# Patient Record
Sex: FEMALE | Race: WHITE | NOT HISPANIC OR LATINO | Employment: OTHER | URBAN - METROPOLITAN AREA
[De-identification: names, ages, dates, MRNs, and addresses within clinical notes are randomized per-mention and may not be internally consistent; named-entity substitution may affect disease eponyms.]

---

## 2018-06-16 ENCOUNTER — OFFICE VISIT (OUTPATIENT)
Dept: URGENT CARE | Facility: CLINIC | Age: 63
End: 2018-06-16
Payer: COMMERCIAL

## 2018-06-16 VITALS
HEIGHT: 60 IN | TEMPERATURE: 98 F | RESPIRATION RATE: 16 BRPM | BODY MASS INDEX: 39.79 KG/M2 | WEIGHT: 202.69 LBS | HEART RATE: 68 BPM | DIASTOLIC BLOOD PRESSURE: 72 MMHG | OXYGEN SATURATION: 97 % | SYSTOLIC BLOOD PRESSURE: 120 MMHG

## 2018-06-16 DIAGNOSIS — J02.9 PHARYNGITIS, UNSPECIFIED ETIOLOGY: Primary | ICD-10-CM

## 2018-06-16 DIAGNOSIS — J30.9 ALLERGIC RHINITIS, UNSPECIFIED SEASONALITY, UNSPECIFIED TRIGGER: ICD-10-CM

## 2018-06-16 LAB
CTP QC/QA: YES
S PYO RRNA THROAT QL PROBE: NEGATIVE

## 2018-06-16 PROCEDURE — 99999 PR PBB SHADOW E&M-NEW PATIENT-LVL IV: CPT | Mod: PBBFAC,,, | Performed by: FAMILY MEDICINE

## 2018-06-16 PROCEDURE — 87880 STREP A ASSAY W/OPTIC: CPT | Mod: QW,S$GLB,, | Performed by: FAMILY MEDICINE

## 2018-06-16 PROCEDURE — 87081 CULTURE SCREEN ONLY: CPT

## 2018-06-16 PROCEDURE — 99203 OFFICE O/P NEW LOW 30 MIN: CPT | Mod: 25,S$GLB,, | Performed by: FAMILY MEDICINE

## 2018-06-16 PROCEDURE — 3008F BODY MASS INDEX DOCD: CPT | Mod: CPTII,S$GLB,, | Performed by: FAMILY MEDICINE

## 2018-06-16 PROCEDURE — 96372 THER/PROPH/DIAG INJ SC/IM: CPT | Mod: S$GLB,,, | Performed by: FAMILY MEDICINE

## 2018-06-16 RX ORDER — METOPROLOL SUCCINATE 25 MG/1
TABLET, EXTENDED RELEASE ORAL
COMMUNITY
Start: 2018-05-14

## 2018-06-16 RX ORDER — TRIAMCINOLONE ACETONIDE 40 MG/ML
40 INJECTION, SUSPENSION INTRA-ARTICULAR; INTRAMUSCULAR
Status: COMPLETED | OUTPATIENT
Start: 2018-06-16 | End: 2018-06-16

## 2018-06-16 RX ORDER — METHYLPREDNISOLONE 4 MG/1
TABLET ORAL
Qty: 1 PACKAGE | Refills: 0 | Status: SHIPPED | OUTPATIENT
Start: 2018-06-17 | End: 2018-11-17

## 2018-06-16 RX ADMIN — TRIAMCINOLONE ACETONIDE 40 MG: 40 INJECTION, SUSPENSION INTRA-ARTICULAR; INTRAMUSCULAR at 08:06

## 2018-06-16 NOTE — PROGRESS NOTES
Subjective:       Patient ID: Jeannette Davis is a 62 y.o. female.    Chief Complaint: Sore Throat    Sore Throat    This is a new problem. The current episode started in the past 7 days. The problem has been gradually improving. There has been no fever. The pain is at a severity of 6/10. The pain is moderate. Associated symptoms include ear pain, a hoarse voice and swollen glands. Pertinent negatives include no abdominal pain, coughing, diarrhea, drooling, headaches, shortness of breath, trouble swallowing or vomiting. She has had no exposure to strep. Treatments tried: Loratadine, Flonase, throat lozenges. The treatment provided no relief.   Patient reports allergies at baseline. No recent travel. No recent antibiotic use. Denies any reflux.    Past Medical History:   Diagnosis Date    Hypertension      Past Surgical History:   Procedure Laterality Date    HYSTERECTOMY      LEG SURGERY      TONSILLECTOMY       Family History   Problem Relation Age of Onset    Glaucoma Mother     Cataracts Mother     Cataracts Sister      Review of Systems   Constitutional: Negative for chills, fatigue and fever.   HENT: Positive for ear pain, hoarse voice, postnasal drip and sore throat. Negative for drooling and trouble swallowing.    Eyes: Negative for visual disturbance.   Respiratory: Negative for cough and shortness of breath.    Cardiovascular: Negative for chest pain and palpitations.   Gastrointestinal: Negative for abdominal pain, diarrhea, nausea and vomiting.   Genitourinary: Negative for decreased urine volume and difficulty urinating.   Musculoskeletal: Negative for arthralgias and myalgias.   Skin: Negative for rash.   Allergic/Immunologic: Positive for environmental allergies.   Neurological: Negative for dizziness, weakness and headaches.   Hematological: Positive for adenopathy.   Psychiatric/Behavioral: Positive for sleep disturbance.       Objective:   /72 (BP Location: Right arm, Patient  Position: Sitting, BP Method: Large (Manual))   Pulse 68   Temp 97.6 °F (36.4 °C) (Tympanic)   Resp 16   Ht 5' (1.524 m)   Wt 92 kg (202 lb 11.4 oz)   SpO2 97%   BMI 39.59 kg/m²   Physical Exam   Constitutional: She appears well-developed and well-nourished. No distress.   Obese, mildly ill appearing, non-toxic   HENT:   Head: Normocephalic and atraumatic.   Right Ear: Tympanic membrane, external ear and ear canal normal.   Left Ear: Tympanic membrane, external ear and ear canal normal.   Nose: Mucosal edema present. No rhinorrhea.   Mouth/Throat: Uvula is midline. Posterior oropharyngeal erythema present. No oropharyngeal exudate.   Eyes: Conjunctivae and EOM are normal. Pupils are equal, round, and reactive to light.   Neck: Normal range of motion. Neck supple.   Cardiovascular: Normal rate, regular rhythm and normal heart sounds.    Pulmonary/Chest: Effort normal and breath sounds normal.   Abdominal: Soft. Bowel sounds are normal.   Lymphadenopathy:     She has no cervical adenopathy.   Skin: Skin is warm and dry. She is not diaphoretic.   Psychiatric: She has a normal mood and affect.       Assessment:       1. Pharyngitis, unspecified etiology    2. Allergic rhinitis, unspecified seasonality, unspecified trigger        Plan:      Pharyngitis, unspecified etiology  Negative RST. Suspect viral etiology and underlying AR as contributory. Throat culture pending. Kenalog IM provided in office. She will start Medrol ana tomorrow. Vocal rest. Magic mouthwash prn. See PCP if symptoms worsen or fail to improve over the weekend. Discussed self care for sore throat.  -     triamcinolone acetonide injection 40 mg; Inject 1 mL (40 mg total) into the muscle one time.  -     POCT Rapid Strep A  -     Strep A culture, throat  -     methylPREDNISolone (MEDROL DOSEPACK) 4 mg tablet; use as directed  Dispense: 1 Package; Refill: 0  -     Discontinue: (Magic mouthwash) 1:1:1 Benadryl 12.5mg/5ml liq, aluminum & magnesium  hydroxide-simehticone (Maalox), lidocaine viscous 2%; Swish and spit 5 mLs every 4 (four) hours as needed. For sore throat  Dispense: 360 mL; Refill: 0  -     (Magic mouthwash) 1:1:1 Benadryl 12.5mg/5ml liq, aluminum & magnesium hydroxide-simehticone (Maalox), lidocaine viscous 2%; Swish and spit 5 mLs every 4 (four) hours as needed. For sore throat  Dispense: 360 mL; Refill: 0    Allergic rhinitis, unspecified seasonality, unspecified trigger  Continue with Loratadine and Flonase use.

## 2018-06-18 LAB — BACTERIA THROAT CULT: NORMAL

## 2018-11-17 ENCOUNTER — OFFICE VISIT (OUTPATIENT)
Dept: INTERNAL MEDICINE | Facility: CLINIC | Age: 63
End: 2018-11-17
Payer: COMMERCIAL

## 2018-11-17 VITALS
TEMPERATURE: 97 F | BODY MASS INDEX: 39.05 KG/M2 | OXYGEN SATURATION: 97 % | WEIGHT: 198.88 LBS | SYSTOLIC BLOOD PRESSURE: 138 MMHG | RESPIRATION RATE: 17 BRPM | HEART RATE: 59 BPM | HEIGHT: 60 IN | DIASTOLIC BLOOD PRESSURE: 78 MMHG

## 2018-11-17 DIAGNOSIS — J06.9 UPPER RESPIRATORY TRACT INFECTION, UNSPECIFIED TYPE: Primary | ICD-10-CM

## 2018-11-17 DIAGNOSIS — R73.03 PREDIABETES: ICD-10-CM

## 2018-11-17 DIAGNOSIS — J02.9 SORE THROAT: ICD-10-CM

## 2018-11-17 DIAGNOSIS — Z20.818 STREPTOCOCCUS EXPOSURE: ICD-10-CM

## 2018-11-17 DIAGNOSIS — I10 ESSENTIAL HYPERTENSION: ICD-10-CM

## 2018-11-17 LAB
CTP QC/QA: YES
S PYO RRNA THROAT QL PROBE: NEGATIVE

## 2018-11-17 PROCEDURE — 96372 THER/PROPH/DIAG INJ SC/IM: CPT | Mod: S$GLB,,, | Performed by: FAMILY MEDICINE

## 2018-11-17 PROCEDURE — 99999 PR PBB SHADOW E&M-EST. PATIENT-LVL III: CPT | Mod: PBBFAC,,, | Performed by: FAMILY MEDICINE

## 2018-11-17 PROCEDURE — 3008F BODY MASS INDEX DOCD: CPT | Mod: CPTII,S$GLB,, | Performed by: FAMILY MEDICINE

## 2018-11-17 PROCEDURE — 87880 STREP A ASSAY W/OPTIC: CPT | Mod: QW,S$GLB,, | Performed by: FAMILY MEDICINE

## 2018-11-17 PROCEDURE — 99213 OFFICE O/P EST LOW 20 MIN: CPT | Mod: 25,S$GLB,, | Performed by: FAMILY MEDICINE

## 2018-11-17 RX ORDER — BETAMETHASONE SODIUM PHOSPHATE AND BETAMETHASONE ACETATE 3; 3 MG/ML; MG/ML
6 INJECTION, SUSPENSION INTRA-ARTICULAR; INTRALESIONAL; INTRAMUSCULAR; SOFT TISSUE
Status: COMPLETED | OUTPATIENT
Start: 2018-11-17 | End: 2018-11-17

## 2018-11-17 RX ADMIN — BETAMETHASONE SODIUM PHOSPHATE AND BETAMETHASONE ACETATE 6 MG: 3; 3 INJECTION, SUSPENSION INTRA-ARTICULAR; INTRALESIONAL; INTRAMUSCULAR; SOFT TISSUE at 09:11

## 2018-11-17 NOTE — PROGRESS NOTES
Subjective:       Patient ID: Jeannette Davis is a 63 y.o. female.    Chief Complaint: Otalgia and Sore Throat      HPI Comments:       Current Outpatient Medications:     (Magic mouthwash) 1:1:1 Benadryl 12.5mg/5ml liq, aluminum & magnesium hydroxide-simehticone (Maalox), lidocaine viscous 2%, Swish and spit 5 mLs every 4 (four) hours as needed. For sore throat, Disp: 360 mL, Rfl: 0    atorvastatin (LIPITOR) 20 MG tablet, Take 20 mg by mouth once daily., Disp: , Rfl:     estradiol (ESTRACE) 0.5 MG tablet, Take 0.5 mg by mouth once daily., Disp: , Rfl:     hydrochlorothiazide (HYDRODIURIL) 25 MG tablet, Take 25 mg by mouth once daily., Disp: , Rfl:     metoprolol succinate (TOPROL-XL) 25 MG 24 hr tablet, TAKE 1 TABLET BY MOUTH DAILY, Disp: , Rfl:     olmesartan (BENICAR) 20 MG tablet, Take 10 mg by mouth as needed. , Disp: , Rfl:   No current facility-administered medications for this visit.       3 day history of ST, PND sneezing and coughing. Clear mucus. R ear pain. No fever, chills, GI sx. Recent strep exp. Non-smoker. Takes claritin daily. No other cold meds. Nothing makes better or worse.    PMH: HTN, Prediabetes (pt unaware)      Review of Systems   Constitutional: Negative for activity change, appetite change, chills and fever.   HENT: Positive for congestion, postnasal drip, rhinorrhea, sinus pressure, sneezing and sore throat.    Respiratory: Positive for cough. Negative for shortness of breath and wheezing.    Cardiovascular: Negative for chest pain.   Gastrointestinal: Negative for abdominal pain, diarrhea and nausea.   Genitourinary: Negative for difficulty urinating.   Musculoskeletal: Negative for arthralgias and myalgias.   Neurological: Negative for dizziness and headaches.       Objective:      Vitals:    11/17/18 0846   BP: 138/78   Pulse: (!) 59   Resp: 17   Temp: 97.4 °F (36.3 °C)   SpO2: 97%   Weight: 90.2 kg (198 lb 13.7 oz)   Height: 5' (1.524 m)     Physical Exam   Constitutional:  She is oriented to person, place, and time. She appears well-developed and well-nourished.  Non-toxic appearance. She appears ill. No distress.   Audibly congested in upper airway   HENT:   Head: Normocephalic.   Right Ear: Tympanic membrane, external ear and ear canal normal.   Left Ear: Tympanic membrane, external ear and ear canal normal.   Nose: Mucosal edema and rhinorrhea present.   Mouth/Throat: Mucous membranes are normal. Posterior oropharyngeal edema present. No oropharyngeal exudate or posterior oropharyngeal erythema.   Neck: Neck supple. No thyromegaly present.   Cardiovascular: Normal rate, regular rhythm and normal heart sounds.   No murmur heard.  Pulmonary/Chest: Effort normal and breath sounds normal. She has no wheezes. She has no rales.   Abdominal: Soft. She exhibits no distension.   Musculoskeletal: She exhibits no edema.   Lymphadenopathy:     She has no cervical adenopathy.   Neurological: She is alert and oriented to person, place, and time.   Skin: Skin is warm and dry. She is not diaphoretic.   Psychiatric: She has a normal mood and affect. Her behavior is normal. Judgment and thought content normal.   Nursing note and vitals reviewed.      Assessment:       1. Upper respiratory tract infection, unspecified type    2. Streptococcus exposure    3. Sore throat    4. Essential hypertension    5. Prediabetes        Plan:   Upper respiratory tract infection, unspecified type  Comments:  celestone IM, mucinex DM  Orders:  -     betamethasone acetate-betamethasone sodium phosphate injection 6 mg    Streptococcus exposure  Comments:  rapid strep neg  Orders:  -     POCT Rapid Strep A    Sore throat  Comments:  tylenol, warm salt water gargles.  Orders:  -     POCT Rapid Strep A    Essential hypertension  Comments:  controlled    Prediabetes  Comments:  will f/u with PCP

## 2019-01-19 ENCOUNTER — OFFICE VISIT (OUTPATIENT)
Dept: URGENT CARE | Facility: CLINIC | Age: 64
End: 2019-01-19
Payer: COMMERCIAL

## 2019-01-19 ENCOUNTER — HOSPITAL ENCOUNTER (OUTPATIENT)
Dept: RADIOLOGY | Facility: HOSPITAL | Age: 64
Discharge: HOME | End: 2019-01-19
Attending: PHYSICIAN ASSISTANT
Payer: COMMERCIAL

## 2019-01-19 VITALS
DIASTOLIC BLOOD PRESSURE: 84 MMHG | HEART RATE: 72 BPM | TEMPERATURE: 98 F | SYSTOLIC BLOOD PRESSURE: 146 MMHG | OXYGEN SATURATION: 97 % | WEIGHT: 202.19 LBS | BODY MASS INDEX: 39.69 KG/M2 | HEIGHT: 60 IN
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
## 2019-01-19 DIAGNOSIS — M54.9 BACK PAIN, UNSPECIFIED BACK LOCATION, UNSPECIFIED BACK PAIN LATERALITY, UNSPECIFIED CHRONICITY: Primary | ICD-10-CM

## 2019-01-19 DIAGNOSIS — M54.50 LOW BACK PAIN, NON-SPECIFIC: ICD-10-CM

## 2019-01-19 PROCEDURE — 99999 PR PBB SHADOW E&M-EST. PATIENT-LVL IV: ICD-10-PCS | Mod: PBBFAC,,, | Performed by: PHYSICIAN ASSISTANT

## 2019-01-19 PROCEDURE — 3077F PR MOST RECENT SYSTOLIC BLOOD PRESSURE >= 140 MM HG: ICD-10-PCS | Mod: CPTII,S$GLB,, | Performed by: PHYSICIAN ASSISTANT

## 2019-01-19 PROCEDURE — 3008F PR BODY MASS INDEX (BMI) DOCUMENTED: ICD-10-PCS | Mod: CPTII,S$GLB,, | Performed by: PHYSICIAN ASSISTANT

## 2019-01-19 PROCEDURE — 3077F SYST BP >= 140 MM HG: CPT | Mod: CPTII,S$GLB,, | Performed by: PHYSICIAN ASSISTANT

## 2019-01-19 PROCEDURE — 99214 OFFICE O/P EST MOD 30 MIN: CPT | Mod: S$GLB,,, | Performed by: PHYSICIAN ASSISTANT

## 2019-01-19 PROCEDURE — 3079F PR MOST RECENT DIASTOLIC BLOOD PRESSURE 80-89 MM HG: ICD-10-PCS | Mod: CPTII,S$GLB,, | Performed by: PHYSICIAN ASSISTANT

## 2019-01-19 PROCEDURE — 72110 X-RAY EXAM L-2 SPINE 4/>VWS: CPT | Mod: TC

## 2019-01-19 PROCEDURE — 3008F BODY MASS INDEX DOCD: CPT | Mod: CPTII,S$GLB,, | Performed by: PHYSICIAN ASSISTANT

## 2019-01-19 PROCEDURE — 99214 PR OFFICE/OUTPT VISIT, EST, LEVL IV, 30-39 MIN: ICD-10-PCS | Mod: S$GLB,,, | Performed by: PHYSICIAN ASSISTANT

## 2019-01-19 PROCEDURE — 99999 PR PBB SHADOW E&M-EST. PATIENT-LVL IV: CPT | Mod: PBBFAC,,, | Performed by: PHYSICIAN ASSISTANT

## 2019-01-19 PROCEDURE — 72110 XR LUMBAR SPINE COMPLETE 5 VIEW: ICD-10-PCS | Mod: 26,,, | Performed by: RADIOLOGY

## 2019-01-19 PROCEDURE — 3079F DIAST BP 80-89 MM HG: CPT | Mod: CPTII,S$GLB,, | Performed by: PHYSICIAN ASSISTANT

## 2019-01-19 PROCEDURE — 72110 X-RAY EXAM L-2 SPINE 4/>VWS: CPT | Mod: 26,,, | Performed by: RADIOLOGY

## 2019-01-19 RX ORDER — TIZANIDINE 2 MG/1
4 TABLET ORAL EVERY 8 HOURS PRN
Qty: 30 TABLET | Refills: 0 | Status: SHIPPED | OUTPATIENT
Start: 2019-01-19 | End: 2019-01-29

## 2019-01-19 RX ORDER — METHYLPREDNISOLONE 4 MG/1
TABLET ORAL
Qty: 1 PACKAGE | Refills: 0 | Status: SHIPPED | OUTPATIENT
Start: 2019-01-19 | End: 2020-02-03 | Stop reason: ALTCHOICE

## 2019-01-19 NOTE — PROGRESS NOTES
Subjective:       Patient ID: Jeannette Davis is a 63 y.o. female.    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 The current episode started more than 1 month ago (for about 3-4 months). The problem occurs constantly. The problem has been gradually worsening since onset. The pain is present in the gluteal (lower spine). The pain does not radiate (is starting to feel a little higher in the back; occasional pain down the legs). The pain is moderate. The pain is the same all the time. The symptoms are aggravated by bending. Associated symptoms include leg pain (occasional). Pertinent negatives include no abdominal pain, bladder incontinence, bowel incontinence, chest pain, dysuria, fever, headaches, numbness, perianal numbness or weakness. Treatments tried: heating pad, biofreeze. The treatment provided no relief.     Review of Systems   Constitutional: Negative for chills, fatigue and fever.   HENT: Negative for congestion, ear discharge, ear pain, postnasal drip, rhinorrhea, sinus pressure, sneezing and sore throat.    Eyes: Negative for pain and discharge.   Respiratory: Negative for cough, shortness of breath and wheezing.    Cardiovascular: Negative for chest pain and leg swelling.   Gastrointestinal: Negative for abdominal pain, bowel incontinence, nausea and vomiting.   Genitourinary: Negative for bladder incontinence and dysuria.   Musculoskeletal: Positive for back pain. Negative for myalgias.   Skin: Negative for rash.   Neurological: Negative for weakness, numbness and headaches.       Objective:      BP (!) 146/84 (BP Location: Left arm, Patient Position: Sitting)   Pulse 72   Temp 97.8 °F (36.6 °C) (Oral)   Ht 5' (1.524 m)   Wt 91.7 kg (202 lb 2.6 oz)   SpO2 97%   BMI 39.48 kg/m²   Physical Exam   Constitutional: She is oriented to person, place, and time. She appears well-developed  and well-nourished. No distress.   HENT:   Head: Normocephalic.   Right Ear: External ear normal.   Left Ear: External ear normal.   Nose: Nose normal.   Eyes: Conjunctivae and EOM are normal. Right eye exhibits no discharge. Left eye exhibits no discharge.   Neck: Normal range of motion. Neck supple.   Musculoskeletal:        Lumbar back: She exhibits tenderness and spasm. She exhibits normal range of motion, no bony tenderness and no swelling.   Negative SLR and crossed SLR. No rash.  L1-S1 5/5 strength bilaterally  Sensation intact to light touch bilaterally     Neurological: She is alert and oriented to person, place, and time. She has normal reflexes. She displays normal reflexes. Coordination normal.   Skin: Skin is warm and dry. No rash noted. She is not diaphoretic. No erythema.   Vitals reviewed.      Assessment:       1. Back pain, unspecified back location, unspecified back pain laterality, unspecified chronicity    2. Low back pain, non-specific    3. Fall, initial encounter        Plan:       Back pain, unspecified back location, unspecified back pain laterality, unspecified chronicity  -     Cancel: POCT urine dipstick without microscope  -     methylPREDNISolone (MEDROL DOSEPACK) 4 mg tablet; use as directed  Dispense: 1 Package; Refill: 0    Low back pain, non-specific  -     X-Ray Lumbar Spine Complete 5 View; Future; Expected date: 01/19/2019    Fall, initial encounter  -     X-Ray Lumbar Spine Complete 5 View; Future; Expected date: 01/19/2019    Other orders  -     tiZANidine (ZANAFLEX) 2 MG tablet; Take 2 tablets (4 mg total) by mouth every 8 (eight) hours as needed (muscle pain).  Dispense: 30 tablet; Refill: 0    Negative SLR although following exam patient reports the radiating back pain returned. XR due to symptoms >3 months and hx of fall onto her buttocks about 1 month ago. Continue heat, add zanaflex. She states she has GI issues with NSAID's so will proceed with medrol pack for  inflammation relief. Follow up with PCP if not improving.      Heather Trant PA-C [facility]

## 2019-01-19 NOTE — PATIENT INSTRUCTIONS
General Neck and Back Pain    Both neck and back pain are usually caused by injury to the muscles or ligaments of the spine. Sometimes the disks that separate each bone of the spine may cause pain by pressing on a nearby nerve. Back and neck pain may appear after a sudden twisting or bending force (such as in a car accident), or sometimes after a simple awkward movement. In either case, muscle spasm is often present and adds to the pain.  Acute neck and back pain usually gets better in 1 to 2 weeks. Pain related to disk disease, arthritis in the spinal joints or spinal stenosis (narrowing of the spinal canal) can become chronic and last for months or years.  Back and neck pain are common problems. Most people feel better in 1 or 2 weeks, and most of the rest in 1 to 2 months. Most people can remain active.  People experience and describe pain differently.  · Pain can be sharp, stabbing, shooting, aching, cramping, or burning  · Movement, standing, bending, lifting, sitting, or walking may worsen the pain  · Pain can be localized to one spot or area, or it can be more generalized  · Pain can spread or radiate upwards, downwards, to the front, or go down your arms  · Muscle spasm may occur.  Most of the time mechanical problems with the muscles or spine cause the pain. it is usually caused by an injury, whether known or not, to the muscles or ligaments. While illnesses can cause back pain, it is usually not caused by a serious illness. Pain is usually related to physical activity, whether sports, exercise, work, or normal activity. Sometimes it can occur without an identifiable cause. This can happen simply by stretching or moving wrong, without noting pain at the time. Other causes include:  · Overexertion, lifting, pushing, pulling incorrectly or too aggressively.  · Sudden twisting, bending or stretching from an accident (car or fall), or accidental movement.  · Poor posture  · Poor conditioning, lack of regular  exercise  · Spinal disc disease or arthritis  · Stress  · Pregnancy, or illness like appendicitis, bladder or kidney infection, pelvic infections   Home care  · For neck pain: Use a comfortable pillow that supports the head and keeps the spine in a neutral position. The position of the head should not be tilted forward or backward.  · When in bed, try to find a position of comfort. A firm mattress is best. Try lying flat on your back with pillows under your knees. You can also try lying on your side with your knees bent up towards your chest and a pillow between your knees.  · At first, do not try to stretch out the sore spots. If there is a strain, it is not like the good soreness you get after exercising without an injury. In this case, stretching may make it worse.  · Avoid prolonged sitting, long car rides or travel. This puts more stress on the lower back than standing or walking.  · During the first 24 to 72 hours after an injury, apply an ice pack to the painful area for 20 minutes and then remove it for 20 minutes over a period of 60 to 90 minutes or several times a day.   · You can alternate ice and heat therapies. Talk with your healthcare provider about the best treatment for your back or neck pain. As a safety precaution, do not use a heating pad at bedtime. Sleeping with a heating pad can lead to skin burns or tissue damage.  · Therapeutic massage can help relax the back and neck muscles without stretching them.  · Be aware of safe lifting methods and do not lift anything over 15 pounds until all the pain is gone.  Medications  Talk to your healthcare provider before using medicine, especially if you have other medical problems or are taking other medicines.  · You may use over-the-counter medicine to control pain, unless another pain medicine was prescribed. If you have chronic conditions like diabetes, liver or kidney disease, stomach ulcers,  gastrointestinal bleeding, or are taking blood thinner  medicines.  · Be careful if you are given pain medicines, narcotics, or medicine for muscle spasm. They can cause drowsiness, and can affect your coordination, reflexes, and judgment. Do not drive or operate heavy machinery.  Follow-up care  Follow up with your healthcare provider, or as advised. Physical therapy or further tests may be needed.  If X-rays were taken, you will be notified of any new findings that may affect your care.  Call 911  Seek emergency medical care if any of the following occur:  · Trouble breathing  · Confusion  · Very drowsy or trouble awakening  · Fainting or loss of consciousness  · Rapid or very slow heart rate  · Loss of bowel or bladder control  When to seek medical advice  Call your healthcare provider right away if any of these occur:  · Pain becomes worse or spreads into your arms or legs  · Weakness, numbness or pain in one or both arms or legs  · Numbness in the groin area  · Difficulty walking  · Fever of 100.4ºF (38ºC) or higher, or as directed by your healthcare provider  Date Last Reviewed: 7/1/2016 © 2000-2017 [employer]. [address] [place] [address], [place] [address]. All rights reserved. This information is not intended as a substitute for professional medical care. Always follow your healthcare professional's instructions.

## 2020-02-03 ENCOUNTER — OFFICE VISIT (OUTPATIENT)
Dept: DERMATOLOGY | Facility: CLINIC | Age: 65
End: 2020-02-03
Payer: COMMERCIAL

## 2020-02-03 DIAGNOSIS — L98.9 DISEASE OF SKIN AND SUBCUTANEOUS TISSUE: ICD-10-CM

## 2020-02-03 DIAGNOSIS — L29.9 PRURITUS: Primary | ICD-10-CM

## 2020-02-03 PROCEDURE — 99203 OFFICE O/P NEW LOW 30 MIN: CPT | Mod: S$GLB,,, | Performed by: DERMATOLOGY

## 2020-02-03 PROCEDURE — 99203 PR OFFICE/OUTPT VISIT, NEW, LEVL III, 30-44 MIN: ICD-10-PCS | Mod: S$GLB,,, | Performed by: DERMATOLOGY

## 2020-02-03 PROCEDURE — 99999 PR PBB SHADOW E&M-EST. PATIENT-LVL II: ICD-10-PCS | Mod: PBBFAC,,, | Performed by: DERMATOLOGY

## 2020-02-03 PROCEDURE — 99999 PR PBB SHADOW E&M-EST. PATIENT-LVL II: CPT | Mod: PBBFAC,,, | Performed by: DERMATOLOGY

## 2020-02-03 RX ORDER — SULFAMETHOXAZOLE AND TRIMETHOPRIM 800; 160 MG/1; MG/1
1 TABLET ORAL
COMMUNITY
Start: 2020-01-27 | End: 2020-02-06

## 2020-02-03 RX ORDER — PREDNISONE 20 MG/1
TABLET ORAL
Qty: 42 TABLET | Refills: 0 | Status: SHIPPED | OUTPATIENT
Start: 2020-02-03

## 2020-02-03 RX ORDER — CLONAZEPAM 0.5 MG/1
TABLET ORAL
COMMUNITY
Start: 2020-01-14 | End: 2023-01-10

## 2020-02-03 RX ORDER — ESTRADIOL 2 MG/1
2 TABLET ORAL
COMMUNITY
End: 2020-02-03

## 2020-02-03 RX ORDER — LOSARTAN POTASSIUM 50 MG/1
50 TABLET ORAL
COMMUNITY
Start: 2019-10-10 | End: 2020-02-03

## 2020-02-03 RX ORDER — LOSARTAN POTASSIUM 50 MG/1
50 TABLET ORAL DAILY
COMMUNITY

## 2020-02-03 RX ORDER — ATORVASTATIN CALCIUM 20 MG/1
20 TABLET, FILM COATED ORAL
COMMUNITY
Start: 2018-09-17 | End: 2020-02-03

## 2020-02-03 RX ORDER — HYDROCHLOROTHIAZIDE 25 MG/1
25 TABLET ORAL
COMMUNITY
Start: 2019-10-10 | End: 2020-02-03

## 2020-02-03 RX ORDER — TRIAMCINOLONE ACETONIDE 1 MG/G
CREAM TOPICAL
Qty: 454 G | Refills: 3 | Status: SHIPPED | OUTPATIENT
Start: 2020-02-03

## 2020-02-03 RX ORDER — LORATADINE 10 MG/1
10 TABLET ORAL DAILY
COMMUNITY

## 2020-02-03 NOTE — PROGRESS NOTES
Subjective:       Patient ID:  Jeannette Davis is a 64 y.o. female who presents for   Chief Complaint   Patient presents with    Rash     to Bilateral arms X 2 weeks - improvement permethrin cream      History of Present Illness: The patient presents with chief complaint of itching and rash.  Location: bilateral arms, underarms, legs  Duration: 2 weeks  Signs/Symptoms: itching    Prior treatments: permethrin cream from urgent care- not much help; had decadron shot 1 week ago- improvement with shot for 2 days but then returned  Started Bactrim from PCP 1 week ago. No new meds prior to rash.    Washed sheets with hot water, vacuumed mattresses  No one else at home has rash      Current Skin Care Regimen  Soap: Dove for sensitive skin  Moisturizer: Skin so soft once or twice a week  Detergent:  Unsure but thinks it is all natural  Fabric softener: none  Colognes/Perfumes/Fragrances: once a month  Bathing: once a day, warm     Dad with history of skin cancer.        Review of Systems   Constitutional: Negative for fever, chills, weight loss, fatigue, night sweats and malaise.   Respiratory: Negative for cough.    Skin: Positive for itching and rash.   Hematologic/Lymphatic: Negative for adenopathy.   Allergic/Immunologic: Positive for environmental allergies.        Objective:    Physical Exam   Constitutional: She appears well-developed and well-nourished. No distress.   Neurological: She is alert and oriented to person, place, and time. She is not disoriented.   Psychiatric: She has a normal mood and affect.   Skin:   Areas Examined (abnormalities noted in diagram):   Scalp / Hair Palpated and Inspected  Head / Face Inspection Performed  Neck Inspection Performed  Chest / Axilla Inspection Performed  Abdomen Inspection Performed  Back Inspection Performed  RUE Inspected  LUE Inspection Performed  RLE Inspected  LLE Inspection Performed              Diagram Legend     Erythematous scaling macule/papule c/w  actinic keratosis       Vascular papule c/w angioma      Pigmented verrucoid papule/plaque c/w seborrheic keratosis      Yellow umbilicated papule c/w sebaceous hyperplasia      Irregularly shaped tan macule c/w lentigo     1-2 mm smooth white papules consistent with Milia      Movable subcutaneous cyst with punctum c/w epidermal inclusion cyst      Subcutaneous movable cyst c/w pilar cyst      Firm pink to brown papule c/w dermatofibroma      Pedunculated fleshy papule(s) c/w skin tag(s)      Evenly pigmented macule c/w junctional nevus     Mildly variegated pigmented, slightly irregular-bordered macule c/w mildly atypical nevus      Flesh colored to evenly pigmented papule c/w intradermal nevus       Pink pearly papule/plaque c/w basal cell carcinoma      Erythematous hyperkeratotic cursted plaque c/w SCC      Surgical scar with no sign of skin cancer recurrence      Open and closed comedones      Inflammatory papules and pustules      Verrucoid papule consistent consistent with wart     Erythematous eczematous patches and plaques     Dystrophic onycholytic nail with subungual debris c/w onychomycosis     Umbilicated papule    Erythematous-base heme-crusted tan verrucoid plaque consistent with inflamed seborrheic keratosis     Erythematous Silvery Scaling Plaque c/w Psoriasis     See annotation      Assessment / Plan:       Disease of skin and subcutaneous tissue  - mineral oil prep negative for scabies.  - post scabetic pruritus vs arthropod (bed bug vs fleas vs other?) vs less likely contact derm vs other  - pruritus significantly affecting quality of life. Will treat with taper of prednisone and topical steroid, and sensitive skin regimen. If no improvement, consider biopsy at f/u  -     predniSONE (DELTASONE) 20 MG tablet; Take 3 pills po qam with breakfast x 1 wk then take 2 po qam with breakfast x 1 wk then take 1 po qam with breakfast x 1 wk  Dispense: 42 tablet; Refill: 0  -     triamcinolone acetonide  0.1% (KENALOG) 0.1 % cream; AAA bid to affected areas on arms/legs for up to 4 weeks per course  Dispense: 454 g; Refill: 3               Follow up in about 4 weeks (around 3/2/2020).

## 2020-02-03 NOTE — PATIENT INSTRUCTIONS
Current Skin Care Regimen  Soap: Dove for sensitive skin  Moisturizer: Vanicream cream; apply within 3 minutes after getting out of shower  Detergent:  All Free and Clear   Fabric softener: none  Colognes/Perfumes/Fragrances: none  Bathing: once a day

## 2020-02-28 ENCOUNTER — OFFICE VISIT (OUTPATIENT)
Dept: DERMATOLOGY | Facility: CLINIC | Age: 65
End: 2020-02-28
Payer: COMMERCIAL

## 2020-02-28 DIAGNOSIS — L98.9 DISEASE OF SKIN AND SUBCUTANEOUS TISSUE: Primary | ICD-10-CM

## 2020-02-28 DIAGNOSIS — L29.9 PRURITUS: ICD-10-CM

## 2020-02-28 PROCEDURE — 99999 PR PBB SHADOW E&M-EST. PATIENT-LVL III: CPT | Mod: PBBFAC,,, | Performed by: DERMATOLOGY

## 2020-02-28 PROCEDURE — 99499 NO LOS: ICD-10-PCS | Mod: S$GLB,,, | Performed by: DERMATOLOGY

## 2020-02-28 PROCEDURE — 11105 PUNCH BX SKIN EA SEP/ADDL: CPT | Mod: S$GLB,,, | Performed by: DERMATOLOGY

## 2020-02-28 PROCEDURE — 99999 PR PBB SHADOW E&M-EST. PATIENT-LVL III: ICD-10-PCS | Mod: PBBFAC,,, | Performed by: DERMATOLOGY

## 2020-02-28 PROCEDURE — 99499 UNLISTED E&M SERVICE: CPT | Mod: S$GLB,,, | Performed by: DERMATOLOGY

## 2020-02-28 PROCEDURE — 11105 PR PUNCH BIOPSY, SKIN, EA ADDTL LESION: ICD-10-PCS | Mod: S$GLB,,, | Performed by: DERMATOLOGY

## 2020-02-28 PROCEDURE — 11104 PUNCH BX SKIN SINGLE LESION: CPT | Mod: S$GLB,,, | Performed by: DERMATOLOGY

## 2020-02-28 PROCEDURE — 11104 PR PUNCH BIOPSY, SKIN, SINGLE LESION: ICD-10-PCS | Mod: S$GLB,,, | Performed by: DERMATOLOGY

## 2020-02-28 RX ORDER — HYDROXYZINE HYDROCHLORIDE 25 MG/1
25 TABLET, FILM COATED ORAL NIGHTLY PRN
Qty: 30 TABLET | Refills: 0 | Status: SHIPPED | OUTPATIENT
Start: 2020-02-28

## 2020-02-28 NOTE — PROGRESS NOTES
Subjective:       Patient ID:  Jeannette Davis is a 64 y.o. female who presents for   Chief Complaint   Patient presents with    Itching     all over, face is worse      History of Present Illness: The patient presents for follow up of rash.    The patient was last seen on:  2/3/2020 for initial eval of severely pruritic rash to the BUE x 2 weeks- suspected post-scabetic pruritus vs arthropod (?bed bugs) vs contact derm. Treated with 3 week steroid taper and TAC 0.1% cream. Reports that it helped only minimally. She thinks the areas that were scraped for mineral oil prep are the worst.  Feels like a skin crawling sensation and starts out as what looks like bug bites, then gets bruises around them which she thinks is from the scratching.     Takes daily claritin.    Reports she has treated entire house for bugs and bought a light that is supposed to kill bugs.    Other skin complaints: none        Review of Systems   Constitutional: Positive for malaise. Negative for fever.   HENT: Negative for lip swelling and tongue swelling.    Skin: Positive for itching and rash.   Hematologic/Lymphatic: Does not bruise/bleed easily.        Objective:    Physical Exam   Constitutional: She appears well-developed and well-nourished. No distress.   Neurological: She is alert and oriented to person, place, and time. She is not disoriented.   Psychiatric: She has a normal mood and affect.   Skin:   Areas Examined (abnormalities noted in diagram):   Scalp / Hair Palpated and Inspected  Head / Face Inspection Performed  Neck Inspection Performed  Chest / Axilla Inspection Performed  Abdomen Inspection Performed  Back Inspection Performed  RUE Inspected  LUE Inspection Performed  RLE Inspected  LLE Inspection Performed                   Diagram Legend     Erythematous scaling macule/papule c/w actinic keratosis       Vascular papule c/w angioma      Pigmented verrucoid papule/plaque c/w seborrheic keratosis      Yellow  umbilicated papule c/w sebaceous hyperplasia      Irregularly shaped tan macule c/w lentigo     1-2 mm smooth white papules consistent with Milia      Movable subcutaneous cyst with punctum c/w epidermal inclusion cyst      Subcutaneous movable cyst c/w pilar cyst      Firm pink to brown papule c/w dermatofibroma      Pedunculated fleshy papule(s) c/w skin tag(s)      Evenly pigmented macule c/w junctional nevus     Mildly variegated pigmented, slightly irregular-bordered macule c/w mildly atypical nevus      Flesh colored to evenly pigmented papule c/w intradermal nevus       Pink pearly papule/plaque c/w basal cell carcinoma      Erythematous hyperkeratotic cursted plaque c/w SCC      Surgical scar with no sign of skin cancer recurrence      Open and closed comedones      Inflammatory papules and pustules      Verrucoid papule consistent consistent with wart     Erythematous eczematous patches and plaques     Dystrophic onycholytic nail with subungual debris c/w onychomycosis     Umbilicated papule    Erythematous-base heme-crusted tan verrucoid plaque consistent with inflamed seborrheic keratosis     Erythematous Silvery Scaling Plaque c/w Psoriasis     See annotation                              Assessment / Plan:      Pathology Orders:     Normal Orders This Visit    Specimen to Pathology, Dermatology     Questions:    Procedure Type:  Dermatology and skin neoplasms    Number of Specimens:  2    ------------------------:  -------------------------    Spec 1 Procedure:  Biopsy    Spec 1 Clinical Impression:  arthropod bite reaction vs scabies vs contact dermatitis r/o urticarial vasculitis    Spec 1 Source:  L buttock    ------------------------:  -------------------------    Spec 2 Procedure:  Biopsy    Spec 2 Clinical Impression:  arthropod bite reaction vs scabies vs contact dermatitis r/o urticarial vasculitis    Spec 2 Source:  R arm        Disease of skin and subcutaneous tissue  -     Specimen to  Pathology, Dermatology  - clinically still consistent with arthropod bite reaction, possibly bed bugs, but will biopsy to r/o urticarial vasculitis given surrounding bruising. Contact derm also on ddx.  - start OTC Xyzal in the evening in addition to her claritin in the mornings  - start Sarna Sensitive lotion OTC  - can continue TAC 0.1% cream BID to new lesions    Punch biopsy procedure note: x 2  Punch biopsy performed after verbal and written consent obtained. Area marked and prepped with alcohol. Approximately 1cc of 1% lidocaine with epinephrine injected. 4 mm disposable punch used to remove lesion. Hemostasis obtained and biopsy site closed with 2 nylon sutures. Wound care instructions reviewed with patient and handout given.      Pruritus  -     hydrOXYzine HCl (ATARAX) 25 MG tablet; Take 1 tablet (25 mg total) by mouth nightly as needed for Itching. Caution- can cause drowsiness  Dispense: 30 tablet; Refill: 0             Follow up in about 2 weeks (around 3/13/2020) for for nurse visit for SR.

## 2020-03-10 DIAGNOSIS — L98.9 DISEASE OF SKIN AND SUBCUTANEOUS TISSUE: Primary | ICD-10-CM

## 2020-03-10 RX ORDER — MUPIROCIN 20 MG/G
OINTMENT TOPICAL
Qty: 30 G | Refills: 0 | Status: SHIPPED | OUTPATIENT
Start: 2020-03-10

## 2020-03-11 LAB
FINAL PATHOLOGIC DIAGNOSIS: NORMAL
GROSS: NORMAL

## 2020-03-13 ENCOUNTER — CLINICAL SUPPORT (OUTPATIENT)
Dept: DERMATOLOGY | Facility: CLINIC | Age: 65
End: 2020-03-13
Payer: COMMERCIAL

## 2020-03-13 DIAGNOSIS — L98.9 DISEASE OF SKIN AND SUBCUTANEOUS TISSUE: Primary | ICD-10-CM

## 2020-03-13 PROCEDURE — 99999 PR PBB SHADOW E&M-EST. PATIENT-LVL III: ICD-10-PCS | Mod: PBBFAC,,,

## 2020-03-13 PROCEDURE — 99999 PR PBB SHADOW E&M-EST. PATIENT-LVL III: CPT | Mod: PBBFAC,,,

## 2020-03-13 RX ORDER — CLONAZEPAM 0.5 MG/1
TABLET ORAL
COMMUNITY
Start: 2020-02-11

## 2021-04-28 ENCOUNTER — PATIENT MESSAGE (OUTPATIENT)
Dept: RESEARCH | Facility: HOSPITAL | Age: 66
End: 2021-04-28

## 2022-03-27 NOTE — PATIENT INSTRUCTIONS
-OTC: Start Xyzal 5 mg every evening in addition to your Claritin that you take every morning.   -Start OTC Sarna Sensitive (pramoxine) lotion as often as needed to itchy spots      Punch Biopsy Wound Care    Your doctor has performed a punch biopsy today.  A band aid and antibiotic ointment has been placed over the site.  This should remain in place for 24 hours.  It is recommended that you keep the area dry for the first 24 hours.  After 24 hours, you may remove the band aid and wash the area with warm soap and water and apply Vaseline jelly.  Many patients prefer to use Neosporin or Bacitracin ointment.  This is acceptable; however know that you can develop an allergy to this medication even if you have used it safely for years.  It is important to keep the area moist.  Letting it dry out and get air slows healing time, will worsen the scar, and make it more difficult to remove the stitches if they were placed.  Band aid is optional after first 24 hours.      If you notice increasing redness, tenderness, pain, or yellow drainage at the biopsy or surgical site, please notify your doctor.  These are signs of an infection.    If your biopsy/surgical site is bleeding, apply firm pressure for 15 minutes straight.  Repeat for another 15 minutes, if it is still bleeding.   If the surgical site continues to bleed, then please contact your doctor.                [employer]

## 2023-01-10 ENCOUNTER — HOSPITAL ENCOUNTER (OUTPATIENT)
Dept: RADIOLOGY | Facility: HOSPITAL | Age: 68
Discharge: HOME | End: 2023-01-10
Attending: ANESTHESIOLOGY
Payer: MEDICARE

## 2023-01-10 ENCOUNTER — OFFICE VISIT (OUTPATIENT)
Dept: PAIN MEDICINE | Facility: CLINIC | Age: 68
End: 2023-01-10
Payer: MEDICARE

## 2023-01-10 VITALS
SYSTOLIC BLOOD PRESSURE: 133 MMHG | HEIGHT: 60 IN | DIASTOLIC BLOOD PRESSURE: 64 MMHG | HEART RATE: 66 BPM | BODY MASS INDEX: 43.41 KG/M2 | WEIGHT: 221.13 LBS

## 2023-01-10 DIAGNOSIS — M54.16 LUMBAR RADICULOPATHY, CHRONIC: ICD-10-CM

## 2023-01-10 DIAGNOSIS — M54.9 DORSALGIA, UNSPECIFIED: ICD-10-CM

## 2023-01-10 DIAGNOSIS — M54.16 LUMBAR RADICULOPATHY: ICD-10-CM

## 2023-01-10 DIAGNOSIS — M54.12 CERVICAL RADICULOPATHY: ICD-10-CM

## 2023-01-10 DIAGNOSIS — M47.816 LUMBAR SPONDYLOSIS: ICD-10-CM

## 2023-01-10 DIAGNOSIS — M51.36 DDD (DEGENERATIVE DISC DISEASE), LUMBAR: ICD-10-CM

## 2023-01-10 DIAGNOSIS — M47.812 CERVICAL SPONDYLOSIS: ICD-10-CM

## 2023-01-10 DIAGNOSIS — M50.30 DDD (DEGENERATIVE DISC DISEASE), CERVICAL: Primary | ICD-10-CM

## 2023-01-10 PROCEDURE — 72114 X-RAY EXAM L-S SPINE BENDING: CPT | Mod: TC,PN

## 2023-01-10 PROCEDURE — 99999 PR PBB SHADOW E&M-EST. PATIENT-LVL V: CPT | Mod: PBBFAC,,, | Performed by: ANESTHESIOLOGY

## 2023-01-10 PROCEDURE — 99203 OFFICE O/P NEW LOW 30 MIN: CPT | Mod: S$PBB,,, | Performed by: ANESTHESIOLOGY

## 2023-01-10 PROCEDURE — 99203 PR OFFICE/OUTPT VISIT, NEW, LEVL III, 30-44 MIN: ICD-10-PCS | Mod: S$PBB,,, | Performed by: ANESTHESIOLOGY

## 2023-01-10 PROCEDURE — 72040 X-RAY EXAM NECK SPINE 2-3 VW: CPT | Mod: 26,,, | Performed by: RADIOLOGY

## 2023-01-10 PROCEDURE — 72040 X-RAY EXAM NECK SPINE 2-3 VW: CPT | Mod: TC,PN

## 2023-01-10 PROCEDURE — 72114 X-RAY EXAM L-S SPINE BENDING: CPT | Mod: 26,,, | Performed by: RADIOLOGY

## 2023-01-10 PROCEDURE — 72114 XR LUMBAR SPINE 5 VIEW WITH FLEX AND EXT: ICD-10-PCS | Mod: 26,,, | Performed by: RADIOLOGY

## 2023-01-10 PROCEDURE — 72040 XR CERVICAL SPINE 2 OR 3 VIEWS: ICD-10-PCS | Mod: 26,,, | Performed by: RADIOLOGY

## 2023-01-10 PROCEDURE — 99215 OFFICE O/P EST HI 40 MIN: CPT | Mod: PBBFAC,PN | Performed by: ANESTHESIOLOGY

## 2023-01-10 PROCEDURE — 99999 PR PBB SHADOW E&M-EST. PATIENT-LVL V: ICD-10-PCS | Mod: PBBFAC,,, | Performed by: ANESTHESIOLOGY

## 2023-01-10 RX ORDER — PREGABALIN 50 MG/1
50 CAPSULE ORAL 2 TIMES DAILY
Qty: 60 CAPSULE | Refills: 1 | Status: SHIPPED | OUTPATIENT
Start: 2023-01-10 | End: 2023-03-16

## 2023-01-10 RX ORDER — NABUMETONE 750 MG/1
750 TABLET, FILM COATED ORAL 2 TIMES DAILY PRN
Qty: 60 TABLET | Refills: 1 | Status: SHIPPED | OUTPATIENT
Start: 2023-01-10 | End: 2023-04-19 | Stop reason: SDUPTHER

## 2023-01-11 NOTE — PROGRESS NOTES
New Patient Interventional Pain Note (Initial Visit)    Referring Physician: Self, Aaareferral    PCP: Primary Doctor No    Chief Complaint:   Chief Complaint   Patient presents with    Low-back Pain    Neck Pain        SUBJECTIVE:    Jeannette Davis is a 67 y.o. female who presents to the clinic for the evaluation of neck and lower back pain.   Patient reports 4 year history of neck lower back pain.  Neck pain is described as an achy throbbing pain across the right side of her neck with occasional radiation to her right shoulder interscapular area.  She denies any radiation into her right upper extremities.  Pain is worse with extension and lifting, better with flexion and rest.    Lower back pain described as a stabbing burning pain in her lower back, right greater than left.  Pain radiates down her right lower extremity along the lateral posterior aspect to her mid calf.  Pain is worse with extension prolonged standing, better with sitting down and flexion.  Pain is rated a 4/10, but can increase to an 8/10 with activity. Denies any fevers, chills, changes in gait, weakness, or bowel and bladder incontinence      Non-Pharmacologic Treatments:  Physical Therapy/Home Exercise: yes  Ice/Heat:yes  TENS: no  Acupuncture: no  Massage: yes  Chiropractic: no        Previous Pain Medications:  NSAIDs, Tylenol, muscle relaxers, opioids, topicals     report:  Reviewed and consistent with medication use as prescribed.    Pain Procedures:   None      Imaging:   Results for orders placed during the hospital encounter of 01/19/19    X-Ray Lumbar Spine Complete 5 View    Narrative  EXAMINATION:  XR LUMBAR SPINE COMPLETE 5 VIEW    CLINICAL HISTORY:  Low back pain, minor trauma;Low back pain    TECHNIQUE:  AP, lateral, spot and bilateral oblique views of the lumbar spine were performed.    COMPARISON:  None    FINDINGS:  Vertebral body heights and alignment are maintained.  No fracture or subluxation.  Disc spaces  maintained.  No pars defect.  Soft tissues unremarkable.    IMPRESSION:    Unremarkable lumbar spine      Electronically signed by: Han Guadarrama MD  Date:    01/21/2019  Time:    08:08      Results for orders placed during the hospital encounter of 01/10/23    X-Ray Lumbar Complete Including Flex And Ext    Narrative  EXAMINATION:  XR LUMBAR SPINE 5 VIEW WITH FLEX AND EXT    CLINICAL HISTORY:  - Radiculopathy, lumbar region.    COMPARISON:  01/19/2019    FINDINGS:  Mild degenerative disc disease at L2-3 with endplate sclerosis and spurring.  The disc spaces are otherwise maintained.  Alignment is satisfactory.  Mild facet DJD at L4-5 and L5-S1.  Aortic atherosclerosis.    Impression  Mild degenerative disc disease at L2-3.      Electronically signed by: Adam Dennis MD  Date:    01/10/2023  Time:    15:37        Past Medical History:   Diagnosis Date    Hypertension      Past Surgical History:   Procedure Laterality Date    HYSTERECTOMY      LEG SURGERY      TONSILLECTOMY       Social History     Socioeconomic History    Marital status:    Tobacco Use    Smoking status: Former     Family History   Problem Relation Age of Onset    Glaucoma Mother     Cataracts Mother     Cataracts Sister     Skin cancer Father        Review of patient's allergies indicates:   Allergen Reactions    Ace inhibitors Other (See Comments)     cough      Codeine Nausea And Vomiting    Lisinopril (bulk) Other (See Comments)    Augmentin [amoxicillin-pot clavulanate] Nausea And Vomiting    Amlodipine Swelling     DR. CARRASCO D/C DUE TO SWELLING OF ANKLES    Avapro [irbesartan] Other (See Comments)       Current Outpatient Medications   Medication Sig    atorvastatin (LIPITOR) 20 MG tablet Take 20 mg by mouth once daily.    clonazePAM (KLONOPIN) 0.5 MG tablet TAKE 1 TABLET BY MOUTH TWICE DAILY AS NEEDED FOR ANXIETY FOR UP TO 30 DAYS    estradiol (ESTRACE) 0.5 MG tablet Take 0.5 mg by mouth once daily.    hydrochlorothiazide  (HYDRODIURIL) 25 MG tablet Take 25 mg by mouth once daily.    hydrOXYzine HCl (ATARAX) 25 MG tablet Take 1 tablet (25 mg total) by mouth nightly as needed for Itching. Caution- can cause drowsiness    loratadine (CLARITIN) 10 mg tablet Take 10 mg by mouth once daily.    losartan (COZAAR) 50 MG tablet Take 50 mg by mouth once daily.    metoprolol succinate (TOPROL-XL) 25 MG 24 hr tablet TAKE 1 TABLET BY MOUTH DAILY    mupirocin (BACTROBAN) 2 % ointment AAA three times a day    predniSONE (DELTASONE) 20 MG tablet Take 3 pills po qam with breakfast x 1 wk then take 2 po qam with breakfast x 1 wk then take 1 po qam with breakfast x 1 wk    triamcinolone acetonide 0.1% (KENALOG) 0.1 % cream AAA bid to affected areas on arms/legs for up to 4 weeks per course    clonazePAM (KLONOPIN) 0.5 MG tablet TK 1 T PO  BID PRA FOR UP TO 30 DAYS    nabumetone (RELAFEN) 750 MG tablet Take 1 tablet (750 mg total) by mouth 2 (two) times daily as needed for Pain.    pregabalin (LYRICA) 50 MG capsule Take 1 capsule (50 mg total) by mouth 2 (two) times daily.     No current facility-administered medications for this visit.         ROS  Review of Systems   Constitutional:  Negative for chills, diaphoresis, fatigue and fever.   HENT:  Negative for ear discharge, ear pain, rhinorrhea, trouble swallowing and voice change.    Eyes:  Negative for pain and redness.   Respiratory:  Negative for chest tightness, shortness of breath, wheezing and stridor.    Cardiovascular:  Negative for chest pain and leg swelling.   Gastrointestinal:  Negative for blood in stool, diarrhea, nausea and vomiting.   Endocrine: Negative for cold intolerance and heat intolerance.   Genitourinary:  Positive for urgency. Negative for dysuria and hematuria.   Musculoskeletal:  Positive for arthralgias, back pain, myalgias, neck pain and neck stiffness. Negative for gait problem and joint swelling.   Skin:  Negative for rash.   Neurological:  Positive for weakness and  numbness. Negative for tremors, seizures, speech difficulty, light-headedness and headaches.   Hematological:  Does not bruise/bleed easily.   Psychiatric/Behavioral:  Negative for agitation, confusion and suicidal ideas. The patient is not nervous/anxious.           OBJECTIVE:  /64   Pulse 66   Ht 5' (1.524 m)   Wt 100.3 kg (221 lb 1.9 oz)   BMI 43.18 kg/m²         Physical Exam  Constitutional:       General: She is not in acute distress.     Appearance: Normal appearance. She is not ill-appearing.   HENT:      Head: Normocephalic and atraumatic.      Nose: No congestion or rhinorrhea.   Eyes:      Extraocular Movements: Extraocular movements intact.      Pupils: Pupils are equal, round, and reactive to light.   Cardiovascular:      Pulses: Normal pulses.   Pulmonary:      Effort: Pulmonary effort is normal.   Abdominal:      General: Abdomen is flat.      Palpations: Abdomen is soft.   Musculoskeletal:      Cervical back: Normal range of motion and neck supple.   Skin:     General: Skin is warm and dry.      Capillary Refill: Capillary refill takes less than 2 seconds.   Neurological:      General: No focal deficit present.      Mental Status: She is alert and oriented to person, place, and time.      Cranial Nerves: No cranial nerve deficit.      Sensory: No sensory deficit.      Motor: Weakness present. No abnormal muscle tone.      Gait: Gait abnormal.      Deep Tendon Reflexes: Babinski sign absent on the right side. Babinski sign absent on the left side.      Reflex Scores:       Tricep reflexes are 2+ on the right side and 2+ on the left side.       Bicep reflexes are 2+ on the right side and 2+ on the left side.       Brachioradialis reflexes are 2+ on the right side and 2+ on the left side.       Patellar reflexes are 2+ on the right side and 2+ on the left side.       Achilles reflexes are 1+ on the right side and 1+ on the left side.     Comments: 4/5 strength in right knee extension and knee  flexion.  Otherwise, 5/5 strength in muscle groups of bilateral lower and upper extremities     Psychiatric:         Mood and Affect: Mood normal.         Behavior: Behavior normal.         Thought Content: Thought content normal.         Musculoskeletal:    Cervical Exam  Incision: no  Pain with Flexion: no  Pain with Extension: yes  Paraspinous TTP:  Right cervical paraspinous  Facet TTP:  C5-C6  Spurling:  Negative bilaterally  ROM:  Decreased    Lumbar Exam  Incision: no  Pain with Flexion: yes  Pain with Extension: yes  ROM:  Decreased  Paraspinous TTP:  Right greater than left  Facet TTP:  L4-L5  Facet Loading:  Positive on the right  SLR:  Positive on the right at 80° in L4 distribution  SIJ TTP:  Positive on the right  CHRISTI:  Negative bilateral      LABS:  Lab Results   Component Value Date    WBC 6.79 12/27/2007    HGB 13.2 12/27/2007    HCT 41.0 12/27/2007    .5 (H) 12/27/2007     12/27/2007       CMP  Sodium   Date Value Ref Range Status   09/11/2007 142 136 - 145 mMol/l Final     Potassium   Date Value Ref Range Status   09/11/2007 3.8 3.3 - 5.3 mMol/l Final     Chloride   Date Value Ref Range Status   09/11/2007 107 95 - 110 mMol/l Final     CO2   Date Value Ref Range Status   09/11/2007 27 23.0 - 29.0 mEq/L Final     Glucose   Date Value Ref Range Status   09/11/2007 88 70 - 110 mg/dl Final     BUN   Date Value Ref Range Status   09/11/2007 15 5 - 23 mg/dl Final     Creatinine   Date Value Ref Range Status   09/11/2007 0.7 0.5 - 1.4 mg/dl Final     Calcium   Date Value Ref Range Status   09/11/2007 9.1 8.7 - 10.5 mg/dl Final     Total Protein   Date Value Ref Range Status   06/27/2007 6.8 6.0 - 8.4 gm/dl Final     Albumin   Date Value Ref Range Status   06/27/2007 4.4 3.5 - 5.2 g/dl Final     Total Bilirubin   Date Value Ref Range Status   06/27/2007 0.3 0.1 - 1.0 mg/dl Final     Comment:     These are the guidelines recommended by the .  Especially  for infants and newborns,  interpretation of results should be based  on gestational age, weight and in agreement with clinical  observations.  .  Premature Infant recommended reference range (Bilirubin, Total)  Up to 24 hours.............<8.0 mg/dl  Up to 48 hours............<12.0 mg/dl  3-5 days..................<15.0 mg/dl  6-29 days.................<15.0 mg/dl       Alkaline Phosphatase   Date Value Ref Range Status   06/27/2007 66 45 - 130 U/L Final     AST   Date Value Ref Range Status   12/27/2007 20 0 - 31 U/L Final     ALT   Date Value Ref Range Status   12/27/2007 25 0 - 31 U/L Final       Lab Results   Component Value Date    HGBA1C 5.4 06/20/2006             ASSESSMENT:       67 y.o. year old female with lower back and neck pain, consistent with     1. DDD (degenerative disc disease), cervical  pregabalin (LYRICA) 50 MG capsule    nabumetone (RELAFEN) 750 MG tablet      2. DDD (degenerative disc disease), lumbar  pregabalin (LYRICA) 50 MG capsule    nabumetone (RELAFEN) 750 MG tablet      3. Cervical radiculopathy  X-Ray Cervical Spine 2 or 3 Views    Ambulatory referral/consult to Physical/Occupational Therapy    pregabalin (LYRICA) 50 MG capsule    nabumetone (RELAFEN) 750 MG tablet      4. Lumbar spondylosis  pregabalin (LYRICA) 50 MG capsule    nabumetone (RELAFEN) 750 MG tablet      5. Lumbar radiculopathy  X-Ray Lumbar Complete Including Flex And Ext    MRI Lumbar Spine Without Contrast    Ambulatory referral/consult to Physical/Occupational Therapy    pregabalin (LYRICA) 50 MG capsule    nabumetone (RELAFEN) 750 MG tablet      6. Cervical spondylosis  pregabalin (LYRICA) 50 MG capsule    nabumetone (RELAFEN) 750 MG tablet      7. Dorsalgia, unspecified  pregabalin (LYRICA) 50 MG capsule    nabumetone (RELAFEN) 750 MG tablet      8. Lumbar radiculopathy, chronic  pregabalin (LYRICA) 50 MG capsule    nabumetone (RELAFEN) 750 MG tablet        DDD (degenerative disc disease), cervical  -     pregabalin (LYRICA) 50 MG capsule;  Take 1 capsule (50 mg total) by mouth 2 (two) times daily.  Dispense: 60 capsule; Refill: 1  -     nabumetone (RELAFEN) 750 MG tablet; Take 1 tablet (750 mg total) by mouth 2 (two) times daily as needed for Pain.  Dispense: 60 tablet; Refill: 1    DDD (degenerative disc disease), lumbar  -     pregabalin (LYRICA) 50 MG capsule; Take 1 capsule (50 mg total) by mouth 2 (two) times daily.  Dispense: 60 capsule; Refill: 1  -     nabumetone (RELAFEN) 750 MG tablet; Take 1 tablet (750 mg total) by mouth 2 (two) times daily as needed for Pain.  Dispense: 60 tablet; Refill: 1    Cervical radiculopathy  -     X-Ray Cervical Spine 2 or 3 Views; Future; Expected date: 01/10/2023  -     Ambulatory referral/consult to Physical/Occupational Therapy; Future; Expected date: 01/17/2023  -     pregabalin (LYRICA) 50 MG capsule; Take 1 capsule (50 mg total) by mouth 2 (two) times daily.  Dispense: 60 capsule; Refill: 1  -     nabumetone (RELAFEN) 750 MG tablet; Take 1 tablet (750 mg total) by mouth 2 (two) times daily as needed for Pain.  Dispense: 60 tablet; Refill: 1    Lumbar spondylosis  -     pregabalin (LYRICA) 50 MG capsule; Take 1 capsule (50 mg total) by mouth 2 (two) times daily.  Dispense: 60 capsule; Refill: 1  -     nabumetone (RELAFEN) 750 MG tablet; Take 1 tablet (750 mg total) by mouth 2 (two) times daily as needed for Pain.  Dispense: 60 tablet; Refill: 1    Lumbar radiculopathy  -     X-Ray Lumbar Complete Including Flex And Ext; Future; Expected date: 01/10/2023  -     MRI Lumbar Spine Without Contrast; Future; Expected date: 01/10/2023  -     Ambulatory referral/consult to Physical/Occupational Therapy; Future; Expected date: 01/17/2023  -     pregabalin (LYRICA) 50 MG capsule; Take 1 capsule (50 mg total) by mouth 2 (two) times daily.  Dispense: 60 capsule; Refill: 1  -     nabumetone (RELAFEN) 750 MG tablet; Take 1 tablet (750 mg total) by mouth 2 (two) times daily as needed for Pain.  Dispense: 60 tablet;  Refill: 1    Cervical spondylosis  -     pregabalin (LYRICA) 50 MG capsule; Take 1 capsule (50 mg total) by mouth 2 (two) times daily.  Dispense: 60 capsule; Refill: 1  -     nabumetone (RELAFEN) 750 MG tablet; Take 1 tablet (750 mg total) by mouth 2 (two) times daily as needed for Pain.  Dispense: 60 tablet; Refill: 1    Dorsalgia, unspecified  -     pregabalin (LYRICA) 50 MG capsule; Take 1 capsule (50 mg total) by mouth 2 (two) times daily.  Dispense: 60 capsule; Refill: 1  -     nabumetone (RELAFEN) 750 MG tablet; Take 1 tablet (750 mg total) by mouth 2 (two) times daily as needed for Pain.  Dispense: 60 tablet; Refill: 1    Lumbar radiculopathy, chronic  -     pregabalin (LYRICA) 50 MG capsule; Take 1 capsule (50 mg total) by mouth 2 (two) times daily.  Dispense: 60 capsule; Refill: 1  -     nabumetone (RELAFEN) 750 MG tablet; Take 1 tablet (750 mg total) by mouth 2 (two) times daily as needed for Pain.  Dispense: 60 tablet; Refill: 1             PLAN:   - Interventions:   None at this time  - Anticoagulation use:   no no anticoagulation    - Medications:   Start Lyrica 50 mg twice a day   Start Relafen 750 mg twice a day as needed    - Therapy:    Refer to physical therapy for lower back and neck pain    - Imaging/Diagnostic:   X-rays of lumbar spine reviewed and findings discussed with patient.  Significant for diffuse loss of disc height   We will order MRI of lumbar spine to further evaluate lower back pain with associated right lumbar radiculopathy despite over 8 weeks of conservative therapy   Will obtain x-rays of cervical spine to further evaluate chronic neck pain    - Consults:   None at this time        - Patient Questions: Answered all of the patient's questions regarding diagnosis, therapy, and treatment    - Follow up visit: return to clinic in 4-6 weeks        The above plan and management options were discussed at length with patient. Patient is in agreement with the above and verbalized  understanding.    I discussed the goals of interventional chronic pain management with the patient on today's visit.  I explained the utility of injections for diagnostic and therapeutic purposes.  We discussed a multimodal approach to pain including treating the patient's given worst pain at any given time.  We will use a systematic approach to addressing pain.  We will also adopt a multimodal approach that includes injections, adjuvant medications, physical therapy, at times psychiatry.  There may be a limited role for opioid use intermittently in the treatment of pain, more particularly for acute pain although no one approach can be used as a sole treatment modality.    I emphasized the importance of regular exercise, core strengthening and stretching, diet and weight loss as a cornerstone of long-term pain management.      Stefan Torres MD  Interventional Pain Management  [facility]    Disclaimer:  This note was prepared using voice recognition system and is likely to have sound alike errors that may have been overlooked even after proof reading.  Please call me with any questions

## 2023-01-12 ENCOUNTER — HOSPITAL ENCOUNTER (OUTPATIENT)
Dept: RADIOLOGY | Facility: HOSPITAL | Age: 68
Discharge: HOME | End: 2023-01-12
Attending: ANESTHESIOLOGY
Payer: MEDICARE

## 2023-01-12 DIAGNOSIS — M54.16 LUMBAR RADICULOPATHY: ICD-10-CM

## 2023-01-12 PROCEDURE — 72148 MRI LUMBAR SPINE WITHOUT CONTRAST: ICD-10-PCS | Mod: 26,,, | Performed by: RADIOLOGY

## 2023-01-12 PROCEDURE — 72148 MRI LUMBAR SPINE W/O DYE: CPT | Mod: TC,PN

## 2023-01-12 PROCEDURE — 72148 MRI LUMBAR SPINE W/O DYE: CPT | Mod: 26,,, | Performed by: RADIOLOGY

## 2023-01-18 ENCOUNTER — CLINICAL SUPPORT (OUTPATIENT)
Dept: REHABILITATION | Facility: HOSPITAL | Age: 68
End: 2023-01-18
Attending: ANESTHESIOLOGY
Payer: MEDICARE

## 2023-01-18 DIAGNOSIS — M54.12 CERVICAL RADICULOPATHY: ICD-10-CM

## 2023-01-18 DIAGNOSIS — R53.1 DECREASED RANGE OF MOTION WITH DECREASED STRENGTH: ICD-10-CM

## 2023-01-18 DIAGNOSIS — M25.60 DECREASED RANGE OF MOTION WITH DECREASED STRENGTH: ICD-10-CM

## 2023-01-18 DIAGNOSIS — M54.16 LUMBAR RADICULOPATHY: ICD-10-CM

## 2023-01-18 PROCEDURE — 97161 PT EVAL LOW COMPLEX 20 MIN: CPT

## 2023-01-18 PROCEDURE — 97110 THERAPEUTIC EXERCISES: CPT

## 2023-01-20 ENCOUNTER — DOCUMENTATION ONLY (OUTPATIENT)
Dept: REHABILITATION | Facility: HOSPITAL | Age: 68
End: 2023-01-20

## 2023-01-20 ENCOUNTER — CLINICAL SUPPORT (OUTPATIENT)
Dept: REHABILITATION | Facility: HOSPITAL | Age: 68
End: 2023-01-20
Payer: MEDICARE

## 2023-01-20 DIAGNOSIS — M54.16 LUMBAR RADICULOPATHY: Primary | ICD-10-CM

## 2023-01-20 DIAGNOSIS — M25.60 DECREASED RANGE OF MOTION WITH DECREASED STRENGTH: ICD-10-CM

## 2023-01-20 DIAGNOSIS — R53.1 DECREASED RANGE OF MOTION WITH DECREASED STRENGTH: ICD-10-CM

## 2023-01-20 DIAGNOSIS — M54.12 CERVICAL RADICULOPATHY: ICD-10-CM

## 2023-01-20 PROCEDURE — 97110 THERAPEUTIC EXERCISES: CPT | Mod: CQ

## 2023-01-20 NOTE — PROGRESS NOTES
PT/PTA met face to face to discuss pt's treatment plan and progress towards established goals. Pt will be seen by a physical therapist minimally every 6th visit or every 30 days.        Amos Dela Cruz PTA

## 2023-01-20 NOTE — PROGRESS NOTES
[facility]   Physical Therapy Treatment Note     Name: Jeannette Davis  Clinic Number: [id number]    Therapy Diagnosis:   Encounter Diagnoses   Name Primary?    Lumbar radiculopathy Yes    Cervical radiculopathy     Decreased range of motion with decreased strength      Physician: Stefan Torres MD    Visit Date: 1/20/2023    Physician Orders: PT Eval and Treat  Medical Diagnosis from Referral: Lumbar radiculopathy  Evaluation Date: 1/18/2023  Authorization Period Expiration: 01/10/24  Plan of Care Expiration: 03/19/2023                          Progress Update: 02/18/2023                        FOTO: 1 / 3    Visit # / Visits authorized: 1 /20                         PRECAUTIONS: Standard Precautions     PTA Visit #: 1/5     Time In: 1045  Time Out: 1130  Total Billable Time: 40 minutes (Billing reflects 1 on 1 treatment time spent with patient)     SUBJECTIVE     Patient reports: she occasionally has pain in her right foot but usually it is her low back and knee.     She was compliant with home exercise program.    Response to previous treatment: sore after evaluation with exercises following evaluation    Functional change: none noted    Pain: 3/10     Location: right low back/hip    OBJECTIVE     Objective Measures updated at progress report only unless specified.       TREATMENT     Jeannette received the treatments listed below:     MANUAL THERAPY TECHNIQUES were applied for (0) minutes, including:    Manual Intervention Performed Today    Soft Tissue Mobilization []     Joint Mobilizations []     []     []    Functional Dry Needling  []      Plan for Next Visit: Continue as needed         THERAPEUTIC EXERCISES to develop strength, endurance, ROM, flexibility, posture, and core stabilization for (40) minutes including:    Intervention Performed Today    Nustep  x 5 minutes   Anterior/posterior pelvic tilt x 2 minutes    Sitting neutral posture march in place x 1 minute x 2     Sitting neutral posture long arch quad  x 1 minute x 2     Sitting neutral posture with hip adduction x 1  minute x 3                     Plan for Next Visit:        PATIENT EDUCATION AND HOME EXERCISES     Home Exercises Provided and Patient Education Provided     Education provided: (during session) minutes  PURPOSE: Patient educated on the impairments noted above and the effects of physical therapy intervention to improve overall condition and QOL.   EXERCISE: Patient was educated on all the above exercise prior/during/after for proper posture, positioning, and execution for safe performance with home exercise program.   STRENGTH: Patient educated on the importance of improved core and extremity strength in order to improve alignment of the spine and extremities with static positions and dynamic movement.     Written Home Exercises Provided: yes.  Exercises were reviewed and Jeannette was able to demonstrate them prior to the end of the session.  Jeannette demonstrated good  understanding of the education provided. See EMR under Patient Instructions for exercises provided during therapy sessions.    ASSESSMENT     Patient required multiple cures initially to perform sitting pelvic mobility but caught on quickly after instructions were prvided. She is able to tolerate exercises with breaks due to decreased core endurance.     Jeannette is progressing well towards her goals.   Pt prognosis is Excellent.     Pt will continue to benefit from skilled outpatient physical therapy to address the deficits listed in the problem list box on initial evaluation, provide pt/family education and to maximize pt's level of independence in the home and community environment.     Pt's spiritual, cultural and educational needs considered and pt agreeable to plan of care and goals.     Anticipated Barriers for therapy: sedentary lifestyle and chronicity of condition       GOALS:  SHORT TERM GOALS: 4 weeks, (02/18/23) 1/18/2023    Recent signs and systems trend is improving in order to progress towards LTG's.     Patient will be independent with HEP in order to further progress and return to maximal function.     Pain rating at Worst: 5/10 in order to progress towards increased independence with activity.     Patient will be able to correct postural deviations in sitting and standing, to decrease pain and promote postural awareness for injury prevention.         LONG TERM GOALS: 8 weeks, (03/19/23) 1/18/2023   Patient will return to normal ADL, recreational, and work related activities with less pain and limitation.      Patient will improve AROM to stated goals in order to return to maximal functional potential.      Patient will improve Strength to stated goals of appropriate musculature in order to improve functional independence.      Pain Rating at Best: 1/10 to improve Quality of Life.      Patient will meet predicted functional outcome (FOTO) score: 42% to increase self-worth & perceived functional ability.     Patient will have met/partially met personal goal of: able to navigate 30 steps independently with minimal pain (> 2/10 on pain scale)            PLAN     Continue Plan of Care (POC) and progress per patient tolerance. See treatment section for details on planned progressions next session.      Amos Dela Cruz, PTA

## 2023-01-23 PROBLEM — M25.60 DECREASED RANGE OF MOTION WITH DECREASED STRENGTH: Status: ACTIVE | Noted: 2023-01-23

## 2023-01-23 PROBLEM — M54.12 CERVICAL RADICULOPATHY: Status: ACTIVE | Noted: 2023-01-23

## 2023-01-23 PROBLEM — M54.16 LUMBAR RADICULOPATHY: Status: ACTIVE | Noted: 2023-01-23

## 2023-01-23 PROBLEM — R53.1 DECREASED RANGE OF MOTION WITH DECREASED STRENGTH: Status: ACTIVE | Noted: 2023-01-23

## 2023-01-23 NOTE — PLAN OF CARE
[facility]  Physical Therapy Initial Evaluation    Name: Jeannette Davis  Clinic Number: [id number]    Therapy Diagnosis:   Encounter Diagnoses   Name Primary?    Cervical radiculopathy     Lumbar radiculopathy     Decreased range of motion with decreased strength       Physician: Stefan Torres MD    Physician Orders: PT Eval and Treat  Medical Diagnosis from Referral: Lumbar radiculopathy  Evaluation Date: 1/18/2023  Authorization Period Expiration: 01/10/24  Plan of Care Expiration: 03/19/2023    Progress Update: 02/18/2023    FOTO: 1 / 3    Visit # / Visits authorized: 1 / 1      PRECAUTIONS: Standard Precautions     MD Follow-up: 02/07/2023    Time In: 1000  Time Out: 1045  Total Appointment Time (timed & untimed codes): 35 minutes    SUBJECTIVE   Date of onset: gradual onset     History of current condition - Jeannette is a 67 y.o. female whom reports  neck and lower back pain. Patient reports 4 year history of neck lower back pain.Neck pain is described as an achy throbbing pain across the right side of her neck with occasional radiation to her right shoulder interscapular area.  She denies any radiation into her right upper extremities.  Pain is worse with extension and lifting, better with flexion and rest.      Lower back pain described as a stabbing burning pain in her lower back, right greater than left.  Pain radiates down her right lower extremity along the lateral posterior aspect to her mid calf.  Pain is worse with extension prolonged standing, better with sitting down and flexion.  Pain is rated a 4/10, but can increase to an 8/10 with activity.       Imaging: MRI performed on 01/10/2023  The distal cord and conus reveal normal signal and morphology.     The lumbar vertebra reveal normal alignment, shape and signal intensity.     T12-L1: Unremarkable.     L1-2:     Unremarkable.     L2-3:     Minor disc degeneration with disc desiccation and mild generalized  annular disc bulge.  Mild bilateral foraminal stenosis.     L3-4:     Minor disc desiccation and disc bulge.  Mild left foraminal stenosis.     L4-5:     Minor disc desiccation with minor generalized annular disc bulge.     L5-S1:    Unremarkable.       Prior Therapy: Yes  Social History: Pt lives with their family  Living Environment: House  ADLs unable to complete: Dressing, navigating stairs, and ambulation in community   Gym/Home Equipment: Not   Occupation: Pt is retired   Prior Level of Function: Independent with all ADLs  Current Level of Function: 70% of PLOF  - Sitting Tolerance: 30-60 min  - Standing Tolerance: 10-30 min  - Walking Tolerance: 10-15 min  - Stair Tolerance: 1 flights    Pain:  Current 5 /10, worst 8 /10, best 3 /10   Location: Low back > neck   Description: Aching, Throbbing, and Tight  Aggravating Factors: walking > 10 minutes   Easing Factors: mediation and rest   _______________________________________________________  Medical History:   Past Medical History:   Diagnosis Date    Hypertension        Surgical History:   Jeannette Davis  has a past surgical history that includes Hysterectomy; Tonsillectomy; and Leg Surgery.    Medications:   Jeannette has a current medication list which includes the following prescription(s): atorvastatin, clonazepam, estradiol, hydrochlorothiazide, hydroxyzine hcl, loratadine, losartan, metoprolol succinate, mupirocin, nabumetone, prednisone, pregabalin, and triamcinolone acetonide 0.1%.    Allergies:   Review of patient's allergies indicates:   Allergen Reactions    Ace inhibitors Other (See Comments)     cough      Codeine Nausea And Vomiting    Lisinopril (bulk) Other (See Comments)    Augmentin [amoxicillin-pot clavulanate] Nausea And Vomiting    Amlodipine Swelling     DR. CARRASCO D/C DUE TO SWELLING OF ANKLES    Avapro [irbesartan] Other (See Comments)        OBJECTIVE   (X= Not Tested)   RANGE OF MOTION:    Cervical Spine/ Right  1/18/2023  Left    1/18/2023 Pain/Dysfunction with Movement Goal   Cervical Flexion (60) 75%  With discomfort  100%  Initial:    Cervical Extension (90) 50%  With discomfort  100%  Initial:    Cervical Side Bending (45) 50% 50%  100%  Initial:    Cervical Rotation (75) 75% 75%  100%  Initial:      Lumbar AROM/PROM Spine  Right  1/18/2023 Left    1/18/2023 Pain/Dysfunction with Movement Goal   Lumbar Flexion (60) 75%   100%  Initial:    Lumbar Extension (30) 50%  With discomfort  100%  Initial:    Lumbar Side Bending (25) 50% 50% With discomfort  100%  Initial:    Lumbar Rotation 50% 50% With discomfort  Pain Free  Initial:      Hip AROM/PROM Right  1/18/2023 Left  1/18/2023 Pain/Dysfunction with Movement Goal   Hip IR (45) 10 5  40  Initial:    Hip ER (45) 15 15  40  Initial:      NEURO SCREEN:    Neuro Testing Right  1/18/2023 Left  1/18/2023 Details   Reflexes  X X    Dermatomes WNL WNL    Myotome Deficits WNL WNL    Tone X X    Spasticity X X        FUNCTIONAL SCREEN:    Upper Extremity ROM Details STRENGTH Details   Shoulder WNL  Grossly 4/5    Elbow WNL  Grossly 5/5    Hand/Wrist WNL  Grossly 5/5      Lower Extremity STRENGTH Details   Hip Grossly 4/5 No pain or discomfort   Knee Grossly 4/5 No pain or discomfort   Foot/Ankle Grossly 4/5 No pain or discomfort     Abdominal Strength STRENGTH Details   Pelvic Tilt 2/5    Double Leg Drop X    Plank X        JOINT MOBILITY:     Joint Motion Tested Right  (spine)  1/18/2023 Left   1/18/2023 Goal   Cervical Mobility: C1-2 Normal Normal Normal B   Cervical Moiblity: C3-7 Hypomobile Hypomobile Normal B   Thoracic Mobility: T1-12 Hypomobile Hypomobile Normal B   Lumbar Mobility: L1-5   Normal B   Sacral Mobility   Normal B     Sensation:  Sensation is intact to light touch    Palpation: Increased tone and tenderness noted with palpation to: low back and bilateral glute med region     Posture:  Pt presents with postural abnormalities which include:   Upper Quarter (s): rounded  shoulders and forward head  Trunk:  anteriorly tilted pelvis   Lower Quarter (s): pronated feet     Gait Analysis: The patient ambulated with the following assistive device: straight cane; the pt presents with the following gait abnormalities: decreased step length, lamar, and arm swing; increased forward flexed posture, trunk sway -     Movement Analysis:   Functional Test  Outcome   Double Leg Squat Excessive forward trunk lean; slight trendelenburg sign secondary to lack of motor coordination    Single Leg Step Down x     Rehab Tests  Outcome Norms GOAL   Five Time Sit to Stand x 60s: <11.4 sec  70s: <12.6 sec  80s: 14.8 sec    Timed Up and Go x <13.5 sec    6 minutes walk x 60s: >538f, 572m  70s: >471f, 527m  80s: >417f, 392m      Balance: Balance:    RIGHT  1/18/2023 LEFT  1/18/2023 Goal   Closed 60  60 seconds  Initial: R (s), L (s)   Tandem 0 0 20 seconds  Initial: R (s), L (s)   Single Leg x x 20 seconds  Initial: R (s), L (s)       FUNCTION:     CMS Impairment/Limitation/Restriction for FOTO Lumbar Survey    Therapist reviewed FOTO scores for Jeannette Davis on 1/18/2023.   FOTO documents entered into [employer] - see Media section.    Limitation Score: 53%         TREATMENT     Total Treatment time separate from Evaluation: (20) minutes    Jeannette received therapeutic exercises to develop strength, endurance, ROM, flexibility, and posture for (15) minutes including: x = exercises performed     TherEx 1/18/2023    Recumbent Bike  next    Sitting hip abduction                  Plan for Next Visit: Address glute and quad strength Improve trunk stability; Perform sit<>stand Test     Home Exercises and Patient Education Provided    Education/Self-Care provided: (included in treatment session) minutes   Patient educated on the impairments noted above and the effects of physical therapy intervention to improve overall condition and QOL.   Patient was educated on all the above exercise prior/during/after for  proper posture, positioning, and execution for safe performance with home exercise program.  Exercise Prescription:   1/18/2023: EVAL- Low    Written Home Exercises Provided: yes. Prefers: Electronic Copies  Exercises were reviewed and Jeannette was able to demonstrate them prior to the end of the session.  Jeannette demonstrated good understanding of the education provided.     See EMR under Patient Instructions for exercises provided during therapy sessions.    ASSESSMENT     Jeannette is a 67 y.o. female referred to outpatient Physical Therapy with a medical diagnosis of cervical and lumbar stenosis.  Jeannette presents with clinical signs and symptoms that support this diagnosis with decreased Cervical ROM, decreased lumbar ROM, decreased lower extremity strength (glutes and quads),  lower extremity neural tension, and impaired functional mobility. Radicular symptoms are present from the lumbar spine down into the forefoot of his left lower extremity.    The above impairments will be addressed through manual therapy techniques, therapeutic exercises, functional training, and modalities as necessary. Patient was treated and educated on exercises for home program, progression of therapy, and benefits of therapy to achieve full functional mobility.     Pt prognosis is Excellent.   Pt will benefit from skilled outpatient Physical Therapy to address the deficits stated above and in the chart below, provide pt/family education, and to maximize pt's level of independence.     Plan of care discussed with patient: Yes  Pt's spiritual, cultural and educational needs considered and patient is agreeable to the plan of care and goals as stated below:     Anticipated Barriers for therapy: sedentary lifestyle and chronicity of condition    Medical Necessity is demonstrated by the following  History  Co-morbidities and personal factors that may impact the plan of care Co-morbidities:   diabetes, high BMI, and HTN    Personal  "Factors:   no deficits     low   Examination  Body Structures and Functions, activity limitations and participation restrictions that may impact the plan of care Body Regions:   neck  back  lower extremities    Body Systems:    gross symmetry  ROM  strength  gross coordinated movement  balance  gait  transfers  motor control    Participation Restrictions:   See above in "Current Level of Function"     Activity limitations:   Learning and applying knowledge  no deficits    General Tasks and Commands  no deficits    Communication  no deficits    Mobility  lifting and carrying objects  walking    Self care  dressing  looking after one's health    Domestic Life  doing house work (cleaning house, washing dishes, laundry)  assisting others    Interactions/Relationships  no deficits    Life Areas  no deficits    Community and Social Life  community life  recreation and leisure         low   Clinical Presentation stable and uncomplicated low   Decision Making/ Complexity Score: low       GOALS:  SHORT TERM GOALS: 4 weeks, (02/18/23) 1/18/2023   Recent signs and systems trend is improving in order to progress towards LTG's.    Patient will be independent with HEP in order to further progress and return to maximal function.    Pain rating at Worst: 5/10 in order to progress towards increased independence with activity.    Patient will be able to correct postural deviations in sitting and standing, to decrease pain and promote postural awareness for injury prevention.       LONG TERM GOALS: 8 weeks, (03/19/23) 1/18/2023   Patient will return to normal ADL, recreational, and work related activities with less pain and limitation.     Patient will improve AROM to stated goals in order to return to maximal functional potential.     Patient will improve Strength to stated goals of appropriate musculature in order to improve functional independence.     Pain Rating at Best: 1/10 to improve Quality of Life.     Patient will meet " predicted functional outcome (FOTO) score: 42% to increase self-worth & perceived functional ability.    Patient will have met/partially met personal goal of: able to navigate 30 steps independently with minimal pain (> 2/10 on pain scale)         PLAN   Plan of care Certification: 1/18/2023 to 03/19/2023    Outpatient Physical Therapy 2 times weekly for 8 weeks to include any combination of the following interventions: virtual visits, dry needling, modalities, electrical stimulation (IFC, Pre-Mod, Attended with Functional Dry Needling), Gait Training, Manual Therapy, Moist Heat/ Ice, Neuromuscular Re-ed, Patient Education, Self Care, Therapeutic Exercise, Functional Training, and Therapeutic Activites     Thank you for this referral.    These services are reasonable and necessary for the conditions set forth above while under my care.    Darnell Herman, PT, DPT

## 2023-01-25 ENCOUNTER — CLINICAL SUPPORT (OUTPATIENT)
Dept: REHABILITATION | Facility: HOSPITAL | Age: 68
End: 2023-01-25
Payer: MEDICARE

## 2023-01-25 DIAGNOSIS — R53.1 DECREASED RANGE OF MOTION WITH DECREASED STRENGTH: ICD-10-CM

## 2023-01-25 DIAGNOSIS — M54.12 CERVICAL RADICULOPATHY: ICD-10-CM

## 2023-01-25 DIAGNOSIS — M54.16 LUMBAR RADICULOPATHY: Primary | ICD-10-CM

## 2023-01-25 DIAGNOSIS — M25.60 DECREASED RANGE OF MOTION WITH DECREASED STRENGTH: ICD-10-CM

## 2023-01-25 PROCEDURE — 97110 THERAPEUTIC EXERCISES: CPT

## 2023-01-25 PROCEDURE — 97140 MANUAL THERAPY 1/> REGIONS: CPT

## 2023-01-25 NOTE — PROGRESS NOTES
[facility]   Physical Therapy Treatment Note     Name: Jeannette Davis  Clinic Number: [id number]    Therapy Diagnosis:   Encounter Diagnoses   Name Primary?    Lumbar radiculopathy Yes    Cervical radiculopathy     Decreased range of motion with decreased strength      Physician: Stefan Torres MD  Visit Date: 1/25/2023  Physician Orders: PT Eval and Treat  Medical Diagnosis from Referral: Lumbar radiculopathy  Evaluation Date: 1/18/2023  Authorization Period Expiration: 01/10/24  Plan of Care Expiration: 03/19/2023                          Progress Update: 02/18/2023                        FOTO: 1 / 3    Visit # / Visits authorized: 2/20                         PRECAUTIONS: Standard Precautions     PTA Visit #: 0/5     Time In: 1330  Time Out: 1415  Total Billable Time: 40 minutes (Billing reflects 1 on 1 treatment time spent with patient)     SUBJECTIVE     Patient reports: she occasionally has pain in her right foot but usually it is her low back and knee.     She was compliant with home exercise program.    Response to previous treatment: sore after evaluation with exercises following evaluation    Functional change: none noted    Pain: 3/10     Location: right low back/hip    OBJECTIVE     Objective Measures updated at progress report only unless specified.       TREATMENT     Jeannette received the treatments listed below:     MANUAL THERAPY TECHNIQUES were applied for (10) minutes, including:    Manual Intervention Performed Today    Soft Tissue Mobilization [x]  Glute med/ QLs   Joint Mobilizations [x] Hip distraction     []     []    Functional Dry Needling  []      Plan for Next Visit: Continue as needed     THERAPEUTIC EXERCISES to develop strength, endurance, ROM, flexibility, posture, and core stabilization for (30) minutes including:    Intervention Performed Today    Nustep  x 5 minutes   Anterior/posterior pelvic tilt x 3 minutes    Sitting neutral posture march in  place x 1 minute x 2    Sitting neutral posture long arch quad  x 1 minute x 2    Sidelying hip adduction x 1  minute x 3    Sidelying hip adduction     Sidelying hip abduction x 1 min x 3          Plan for Next Visit:        PATIENT EDUCATION AND HOME EXERCISES     Home Exercises Provided and Patient Education Provided     Education provided: (during session) minutes  PURPOSE: Patient educated on the impairments noted above and the effects of physical therapy intervention to improve overall condition and QOL.   EXERCISE: Patient was educated on all the above exercise prior/during/after for proper posture, positioning, and execution for safe performance with home exercise program.   STRENGTH: Patient educated on the importance of improved core and extremity strength in order to improve alignment of the spine and extremities with static positions and dynamic movement.     Written Home Exercises Provided: yes.  Exercises were reviewed and Jeannette was able to demonstrate them prior to the end of the session.  Jeannette demonstrated good  understanding of the education provided. See EMR under Patient Instructions for exercises provided during therapy sessions.    ASSESSMENT     Jeannette Davis tolerated PT session well with moderate complaints of pain or discomfort, secondary to poor motor coordination and decrease muscle strength. Objective findings are improving with measurements of ROM and functional mobility.  Therapy exercises were reviewed by revisiting exercises given from previous home exercise program while adding glute exercises.  Handouts were not issued during today's visit. Jeannette demonstrated good understanding of new exercises and will continue to progress at home until next follow-up.         Jeannette is progressing well towards her goals.   Pt prognosis is Excellent.     Pt will continue to benefit from skilled outpatient physical therapy to address the deficits listed in the problem list box  on initial evaluation, provide pt/family education and to maximize pt's level of independence in the home and community environment.     Pt's spiritual, cultural and educational needs considered and pt agreeable to plan of care and goals.     Anticipated Barriers for therapy: sedentary lifestyle and chronicity of condition       GOALS:  SHORT TERM GOALS: 4 weeks, (02/18/23) 1/18/2023   Recent signs and systems trend is improving in order to progress towards LTG's.     Patient will be independent with HEP in order to further progress and return to maximal function.     Pain rating at Worst: 5/10 in order to progress towards increased independence with activity.     Patient will be able to correct postural deviations in sitting and standing, to decrease pain and promote postural awareness for injury prevention.         LONG TERM GOALS: 8 weeks, (03/19/23) 1/18/2023   Patient will return to normal ADL, recreational, and work related activities with less pain and limitation.      Patient will improve AROM to stated goals in order to return to maximal functional potential.      Patient will improve Strength to stated goals of appropriate musculature in order to improve functional independence.      Pain Rating at Best: 1/10 to improve Quality of Life.      Patient will meet predicted functional outcome (FOTO) score: 42% to increase self-worth & perceived functional ability.     Patient will have met/partially met personal goal of: able to navigate 30 steps independently with minimal pain (> 2/10 on pain scale)            PLAN     Continue Plan of Care (POC) and progress per patient tolerance. See treatment section for details on planned progressions next session.    Darnell Herman, PT DPT

## 2023-01-27 ENCOUNTER — CLINICAL SUPPORT (OUTPATIENT)
Dept: REHABILITATION | Facility: HOSPITAL | Age: 68
End: 2023-01-27
Payer: MEDICARE

## 2023-01-27 DIAGNOSIS — M25.60 DECREASED RANGE OF MOTION WITH DECREASED STRENGTH: ICD-10-CM

## 2023-01-27 DIAGNOSIS — M54.16 LUMBAR RADICULOPATHY: Primary | ICD-10-CM

## 2023-01-27 DIAGNOSIS — M54.12 CERVICAL RADICULOPATHY: ICD-10-CM

## 2023-01-27 DIAGNOSIS — R53.1 DECREASED RANGE OF MOTION WITH DECREASED STRENGTH: ICD-10-CM

## 2023-01-27 PROCEDURE — 97140 MANUAL THERAPY 1/> REGIONS: CPT | Mod: CQ

## 2023-01-27 PROCEDURE — 97110 THERAPEUTIC EXERCISES: CPT | Mod: CQ

## 2023-01-27 NOTE — PROGRESS NOTES
[facility]   Physical Therapy Treatment Note     Name: Jeannette Davis  Clinic Number: [id number]    Therapy Diagnosis:   Encounter Diagnoses   Name Primary?    Lumbar radiculopathy Yes    Cervical radiculopathy     Decreased range of motion with decreased strength      Physician: Stefan Torres MD  Visit Date: 1/27/2023  Physician Orders: PT Eval and Treat  Medical Diagnosis from Referral: Lumbar radiculopathy  Evaluation Date: 1/18/2023  Authorization Period Expiration: 01/10/24  Plan of Care Expiration: 03/19/2023                          Progress Update: 02/18/2023                        FOTO: 1 / 3    Visit # / Visits authorized: 3/20                         PRECAUTIONS: Standard Precautions     PTA Visit #: 1/5     Time In: 1105  Time Out: 1145  Total Billable Time: 40 minutes (Billing reflects 1 on 1 treatment time spent with patient)     SUBJECTIVE     Patient reports: she is feeling okay today. Her pain is no better or worse from treatment. She is hoping to do some dancing this weekend on Sunday, but knows the knee limits her.     She was compliant with home exercise program.    Response to previous treatment: sore after evaluation with exercises following evaluation    Functional change: none noted    Pain: 3/10     Location: right low back/hip    OBJECTIVE     Objective Measures updated at progress report only unless specified.       TREATMENT     Jeannette received the treatments listed below:     MANUAL THERAPY TECHNIQUES were applied for (10) minutes, including:    Manual Intervention Performed Today    Soft Tissue Mobilization []  Glute med/ QLs   Joint Mobilizations [x] Hip distraction, lumbar distraction     []     []    Functional Dry Needling  []      Plan for Next Visit: Continue as needed     THERAPEUTIC EXERCISES to develop strength, endurance, ROM, flexibility, posture, and core stabilization for (30) minutes including:    Intervention Performed Today     Nustep for endurance x 8 minutes   Anterior/posterior pelvic tilt x 3 minutes    Sitting neutral posture march in place  1 minute x 2    Sitting neutral posture long arch quad   1 minute x 2    Seated hip adduction x 3 minutes    Sidelying hip adduction     Sidelying hip abduction  1 min x 3   Lumbar roll outs x 10s 10x 3 way   Hamstring stretch x Therapist 3x30s each   Lower trunk rotations x 3 minutes   Seated hip abduction belt x 3 minutes     Plan for Next Visit:        PATIENT EDUCATION AND HOME EXERCISES     Home Exercises Provided and Patient Education Provided     Education provided: (during session) minutes  PURPOSE: Patient educated on the impairments noted above and the effects of physical therapy intervention to improve overall condition and QOL.   EXERCISE: Patient was educated on all the above exercise prior/during/after for proper posture, positioning, and execution for safe performance with home exercise program.   STRENGTH: Patient educated on the importance of improved core and extremity strength in order to improve alignment of the spine and extremities with static positions and dynamic movement.     Written Home Exercises Provided: yes.  Exercises were reviewed and Jeannette was able to demonstrate them prior to the end of the session.  Jeannette demonstrated good  understanding of the education provided. See EMR under Patient Instructions for exercises provided during therapy sessions.    ASSESSMENT     Jeannette Davis tolerated PT session well with moderate complaints of pain or discomfort, secondary to poor motor coordination and decrease muscle strength. Objective findings are improving with measurements of ROM and functional mobility.  Therapy exercises were reviewed by revisiting exercises given from previous home exercise program while adding lumbar mobility core and hip strengthening exercises. Patient with some discomfort with lower trunk rotations and being in hooklying, improved  with position change. Handouts were not issued during today's visit. Jeannette demonstrated good understanding of new exercises and will continue to progress at home until next follow-up.         Jeannette is progressing well towards her goals.   Pt prognosis is Excellent.     Pt will continue to benefit from skilled outpatient physical therapy to address the deficits listed in the problem list box on initial evaluation, provide pt/family education and to maximize pt's level of independence in the home and community environment.     Pt's spiritual, cultural and educational needs considered and pt agreeable to plan of care and goals.     Anticipated Barriers for therapy: sedentary lifestyle and chronicity of condition       GOALS:  SHORT TERM GOALS: 4 weeks, (02/18/23) 1/18/2023   Recent signs and systems trend is improving in order to progress towards LTG's.     Patient will be independent with HEP in order to further progress and return to maximal function.     Pain rating at Worst: 5/10 in order to progress towards increased independence with activity.     Patient will be able to correct postural deviations in sitting and standing, to decrease pain and promote postural awareness for injury prevention.         LONG TERM GOALS: 8 weeks, (03/19/23) 1/18/2023   Patient will return to normal ADL, recreational, and work related activities with less pain and limitation.      Patient will improve AROM to stated goals in order to return to maximal functional potential.      Patient will improve Strength to stated goals of appropriate musculature in order to improve functional independence.      Pain Rating at Best: 1/10 to improve Quality of Life.      Patient will meet predicted functional outcome (FOTO) score: 42% to increase self-worth & perceived functional ability.     Patient will have met/partially met personal goal of: able to navigate 30 steps independently with minimal pain (> 2/10 on pain scale)            PLAN      Continue Plan of Care (POC) and progress per patient tolerance. See treatment section for details on planned progressions next session.    Tiffanie Cat, PTA

## 2023-01-31 ENCOUNTER — CLINICAL SUPPORT (OUTPATIENT)
Dept: REHABILITATION | Facility: HOSPITAL | Age: 68
End: 2023-01-31
Payer: MEDICARE

## 2023-01-31 DIAGNOSIS — M54.16 LUMBAR RADICULOPATHY: Primary | ICD-10-CM

## 2023-01-31 DIAGNOSIS — M25.60 DECREASED RANGE OF MOTION WITH DECREASED STRENGTH: ICD-10-CM

## 2023-01-31 DIAGNOSIS — R53.1 DECREASED RANGE OF MOTION WITH DECREASED STRENGTH: ICD-10-CM

## 2023-01-31 DIAGNOSIS — M54.12 CERVICAL RADICULOPATHY: ICD-10-CM

## 2023-01-31 PROCEDURE — 97110 THERAPEUTIC EXERCISES: CPT | Mod: CQ

## 2023-01-31 PROCEDURE — 97140 MANUAL THERAPY 1/> REGIONS: CPT | Mod: CQ

## 2023-01-31 NOTE — PROGRESS NOTES
[facility]   Physical Therapy Treatment Note     Name: Jeannette Davis  Clinic Number: [id number]    Therapy Diagnosis:   Encounter Diagnoses   Name Primary?    Lumbar radiculopathy Yes    Cervical radiculopathy     Decreased range of motion with decreased strength      Physician: Stefan Torres MD  Visit Date: 1/31/2023  Physician Orders: PT Eval and Treat  Medical Diagnosis from Referral: Lumbar radiculopathy  Evaluation Date: 1/18/2023  Authorization Period Expiration: 01/10/24  Plan of Care Expiration: 03/19/2023                          Progress Update: 02/18/2023                        FOTO: 1 / 3    Visit # / Visits authorized: 4/20                         PRECAUTIONS: Standard Precautions     PTA Visit #: 2/5     Time In: 0930  Time Out: 1015  Total Billable Time: 45 minutes (Billing reflects 1 on 1 treatment time spent with patient)     SUBJECTIVE     Patient reports: she was sore after last visit. She states she started having increased back pain last night and she had to take a medicine for it. She did her normal tasks around the house so she is not sure why her back started bothering her last night. She did do laundry, but she normally does that.     She was compliant with home exercise program.    Response to previous treatment: sore after evaluation with exercises following evaluation    Functional change: none noted    Pain: 5/10     Location: right low back/hip    OBJECTIVE     Objective Measures updated at progress report only unless specified.       TREATMENT     Jeannette received the treatments listed below:     MANUAL THERAPY TECHNIQUES were applied for (10) minutes, including:    Manual Intervention Performed Today    Soft Tissue Mobilization []  Glute med/ QLs   Joint Mobilizations [x] Hip distraction, lumbar distraction     []     []    Functional Dry Needling  []      Plan for Next Visit: Continue as needed     THERAPEUTIC EXERCISES to develop strength,  endurance, ROM, flexibility, posture, and core stabilization for (30) minutes including:    Intervention Performed Today    Nustep/Recumbent Bike for endurance x 8 minutes   Anterior/posterior pelvic tilt  3 minutes    Sitting neutral posture march in place  1 minute x 2    Sitting neutral posture long arch quad   1 minute x 2    Seated hip adduction x 3 minutes    Sidelying hip adduction     Sidelying hip abduction  1 min x 3   Lumbar roll outs x 10s 10x 3 way   Hamstring stretch x Therapist 3x30s each   Lower trunk rotations  3 minutes   Seated marches with ab brace x 3 minutes   Long arc quads x 3 minutes        Seated hip abduction belt x 3 minutes     Plan for Next Visit:        PATIENT EDUCATION AND HOME EXERCISES     Home Exercises Provided and Patient Education Provided     Education provided: (during session) minutes  PURPOSE: Patient educated on the impairments noted above and the effects of physical therapy intervention to improve overall condition and QOL.   EXERCISE: Patient was educated on all the above exercise prior/during/after for proper posture, positioning, and execution for safe performance with home exercise program.   STRENGTH: Patient educated on the importance of improved core and extremity strength in order to improve alignment of the spine and extremities with static positions and dynamic movement.     Written Home Exercises Provided: continue prior home exercise program  Exercises were reviewed and Jeannette was able to demonstrate them prior to the end of the session.  Jeannette demonstrated good  understanding of the education provided. See EMR under Patient Instructions for exercises provided during therapy sessions.    ASSESSMENT     Jeannette Davis tolerated PT session well with minimal complaints of pain or discomfort, secondary to poor motor coordination and decrease muscle strength. Objective findings are improving with measurements of ROM and functional mobility.  Therapy  exercises were reviewed by revisiting exercises given from previous home exercise program while adding core activation and stabilization activities. All activities performed in seated today, patient with no reports of increased pain in this position. Handouts were not issued during today's visit. Jeannette demonstrated good understanding of new exercises and will continue to progress at home until next follow-up.         Jeannette is progressing well towards her goals.   Pt prognosis is Excellent.     Pt will continue to benefit from skilled outpatient physical therapy to address the deficits listed in the problem list box on initial evaluation, provide pt/family education and to maximize pt's level of independence in the home and community environment.     Pt's spiritual, cultural and educational needs considered and pt agreeable to plan of care and goals.     Anticipated Barriers for therapy: sedentary lifestyle and chronicity of condition       GOALS:  SHORT TERM GOALS: 4 weeks, (02/18/23) 1/18/2023   Recent signs and systems trend is improving in order to progress towards LTG's.     Patient will be independent with HEP in order to further progress and return to maximal function.     Pain rating at Worst: 5/10 in order to progress towards increased independence with activity.     Patient will be able to correct postural deviations in sitting and standing, to decrease pain and promote postural awareness for injury prevention.         LONG TERM GOALS: 8 weeks, (03/19/23) 1/18/2023   Patient will return to normal ADL, recreational, and work related activities with less pain and limitation.      Patient will improve AROM to stated goals in order to return to maximal functional potential.      Patient will improve Strength to stated goals of appropriate musculature in order to improve functional independence.      Pain Rating at Best: 1/10 to improve Quality of Life.      Patient will meet predicted functional outcome  (FOTO) score: 42% to increase self-worth & perceived functional ability.     Patient will have met/partially met personal goal of: able to navigate 30 steps independently with minimal pain (> 2/10 on pain scale)            PLAN     Continue Plan of Care (POC) and progress per patient tolerance. See treatment section for details on planned progressions next session.    Tiffanie Cat, PTA

## 2023-02-07 ENCOUNTER — OFFICE VISIT (OUTPATIENT)
Dept: PAIN MEDICINE | Facility: CLINIC | Age: 68
End: 2023-02-07
Payer: MEDICARE

## 2023-02-07 VITALS
RESPIRATION RATE: 17 BRPM | SYSTOLIC BLOOD PRESSURE: 142 MMHG | DIASTOLIC BLOOD PRESSURE: 82 MMHG | WEIGHT: 222.69 LBS | HEIGHT: 60 IN | HEART RATE: 75 BPM | BODY MASS INDEX: 43.72 KG/M2

## 2023-02-07 DIAGNOSIS — M54.16 LUMBAR RADICULOPATHY: Primary | ICD-10-CM

## 2023-02-07 PROCEDURE — 99214 PR OFFICE/OUTPT VISIT, EST, LEVL IV, 30-39 MIN: ICD-10-PCS | Mod: S$PBB,,, | Performed by: ANESTHESIOLOGY

## 2023-02-07 PROCEDURE — 99999 PR PBB SHADOW E&M-EST. PATIENT-LVL IV: ICD-10-PCS | Mod: PBBFAC,,, | Performed by: ANESTHESIOLOGY

## 2023-02-07 PROCEDURE — 99214 OFFICE O/P EST MOD 30 MIN: CPT | Mod: S$PBB,,, | Performed by: ANESTHESIOLOGY

## 2023-02-07 PROCEDURE — 99214 OFFICE O/P EST MOD 30 MIN: CPT | Mod: PBBFAC,PN | Performed by: ANESTHESIOLOGY

## 2023-02-07 PROCEDURE — 99999 PR PBB SHADOW E&M-EST. PATIENT-LVL IV: CPT | Mod: PBBFAC,,, | Performed by: ANESTHESIOLOGY

## 2023-02-07 NOTE — H&P (VIEW-ONLY)
Interventional Pain Progress Note       Referring Physician: No ref. provider found    PCP: Primary Doctor No    Chief Complaint:   Chief Complaint   Patient presents with    Low-back Pain     Patient has pain in lower back and neck.  Pain scale 4/10       Interval History (02/07/2023):  Patient returns to clinic to review images.  Since last being seen patient reports improvement in neck and lower back pain with medications and physical therapy.  Today neck pain described as a aching pain across left-sided neck however she reports that typically the right side of her neck the more problematic side.  She denies any radiation to upper extremities.  Pain is worse with extension, better with flexion.  Primary pain is lower back pain that starts as an aching throbbing pain in the midline lower back and radiates down her right lower extremity along the lateral aspect to her knee.  Pain is worse with lifting and prolonged standing, better with sitting down and rest.  Pain is rated a 4/10, however can increase to an 8/10 with activity. Denies any fevers, chills, changes in gait, weakness, or bowel and bladder incontinence        SUBJECTIVE:    Jeannette Davis is a 67 y.o. female who presents to the clinic for the evaluation of neck and lower back pain.   Patient reports 4 year history of neck lower back pain.  Neck pain is described as an achy throbbing pain across the right side of her neck with occasional radiation to her right shoulder interscapular area.  She denies any radiation into her right upper extremities.  Pain is worse with extension and lifting, better with flexion and rest.    Lower back pain described as a stabbing burning pain in her lower back, right greater than left.  Pain radiates down her right lower extremity along the lateral posterior aspect to her mid calf.  Pain is worse with extension prolonged standing, better with sitting down and flexion.  Pain is rated a 4/10, but can increase to an  8/10 with activity. Denies any fevers, chills, changes in gait, weakness, or bowel and bladder incontinence      Non-Pharmacologic Treatments:  Physical Therapy/Home Exercise: yes  Ice/Heat:yes  TENS: no  Acupuncture: no  Massage: yes  Chiropractic: no        Previous Pain Medications:  NSAIDs, Tylenol, muscle relaxers, opioids, topicals     report:  Reviewed and consistent with medication use as prescribed.    Pain Procedures:   None      Imaging:     Results for orders placed during the hospital encounter of 01/12/23    MRI Lumbar Spine Without Contrast    Narrative  EXAMINATION:  MRI LUMBAR SPINE WITHOUT CONTRAST    CLINICAL HISTORY:  Radiculopathy, lumbar regionLow back pain, symptoms persist with > 6wks conservative treatment;Lumbar radiculopathy, symptoms persist with conservative treatment;    TECHNIQUE:  Standard multiplanar noncontrast MRI sequences of the lumbar spine.    COMPARISON:  None    FINDINGS:  The distal cord and conus reveal normal signal and morphology.    The lumbar vertebra reveal normal alignment, shape and signal intensity.    T12-L1: Unremarkable.    L1-2:     Unremarkable.    L2-3:     Minor disc degeneration with disc desiccation and mild generalized annular disc bulge.  Mild bilateral foraminal stenosis.    L3-4:     Minor disc desiccation and disc bulge.  Mild left foraminal stenosis.    L4-5:     Minor disc desiccation with minor generalized annular disc bulge.    L5-S1:    Unremarkable.    Impression  Minor degenerative disc changes as detailed above.      Electronically signed by: Goyo Barbosa MD  Date:    01/12/2023  Time:    12:38    XR CERVICAL SPINE 2 OR 3 VIEWS 1/10/23     CLINICAL HISTORY:  - Radiculopathy, cervical region.     COMPARISON:  None     FINDINGS:  Mild degenerative disc disease at C5-6 with endplate sclerosis and spurring.  Minimal spurring at C6-7. The vertebral body heights and alignment are within normal limits.     Impression:     Degenerative  changes.     Results for orders placed during the hospital encounter of 01/19/19    X-Ray Lumbar Spine Complete 5 View    Narrative  EXAMINATION:  XR LUMBAR SPINE COMPLETE 5 VIEW    CLINICAL HISTORY:  Low back pain, minor trauma;Low back pain    TECHNIQUE:  AP, lateral, spot and bilateral oblique views of the lumbar spine were performed.    COMPARISON:  None    FINDINGS:  Vertebral body heights and alignment are maintained.  No fracture or subluxation.  Disc spaces maintained.  No pars defect.  Soft tissues unremarkable.    IMPRESSION:    Unremarkable lumbar spine      Electronically signed by: Han Guadarrama MD  Date:    01/21/2019  Time:    08:08      Results for orders placed during the hospital encounter of 01/10/23    X-Ray Lumbar Complete Including Flex And Ext    Narrative  EXAMINATION:  XR LUMBAR SPINE 5 VIEW WITH FLEX AND EXT    CLINICAL HISTORY:  - Radiculopathy, lumbar region.    COMPARISON:  01/19/2019    FINDINGS:  Mild degenerative disc disease at L2-3 with endplate sclerosis and spurring.  The disc spaces are otherwise maintained.  Alignment is satisfactory.  Mild facet DJD at L4-5 and L5-S1.  Aortic atherosclerosis.    Impression  Mild degenerative disc disease at L2-3.      Electronically signed by: Adam Dennis MD  Date:    01/10/2023  Time:    15:37        Past Medical History:   Diagnosis Date    Hypertension      Past Surgical History:   Procedure Laterality Date    HYSTERECTOMY      LEG SURGERY      TONSILLECTOMY       Social History     Socioeconomic History    Marital status:    Tobacco Use    Smoking status: Former     Family History   Problem Relation Age of Onset    Glaucoma Mother     Cataracts Mother     Cataracts Sister     Skin cancer Father        Review of patient's allergies indicates:   Allergen Reactions    Ace inhibitors Other (See Comments)     cough      Codeine Nausea And Vomiting    Lisinopril (bulk) Other (See Comments)    Augmentin [amoxicillin-pot clavulanate]  Nausea And Vomiting    Amlodipine Swelling     DR. CARRASCO D/C DUE TO SWELLING OF ANKLES    Avapro [irbesartan] Other (See Comments)       Current Outpatient Medications   Medication Sig    atorvastatin (LIPITOR) 20 MG tablet Take 20 mg by mouth once daily.    clonazePAM (KLONOPIN) 0.5 MG tablet TAKE 1 TABLET BY MOUTH TWICE DAILY AS NEEDED FOR ANXIETY FOR UP TO 30 DAYS    estradiol (ESTRACE) 0.5 MG tablet Take 0.5 mg by mouth once daily.    hydrochlorothiazide (HYDRODIURIL) 25 MG tablet Take 25 mg by mouth once daily.    hydrOXYzine HCl (ATARAX) 25 MG tablet Take 1 tablet (25 mg total) by mouth nightly as needed for Itching. Caution- can cause drowsiness    loratadine (CLARITIN) 10 mg tablet Take 10 mg by mouth once daily.    losartan (COZAAR) 50 MG tablet Take 50 mg by mouth once daily.    metoprolol succinate (TOPROL-XL) 25 MG 24 hr tablet TAKE 1 TABLET BY MOUTH DAILY    mupirocin (BACTROBAN) 2 % ointment AAA three times a day    nabumetone (RELAFEN) 750 MG tablet Take 1 tablet (750 mg total) by mouth 2 (two) times daily as needed for Pain.    predniSONE (DELTASONE) 20 MG tablet Take 3 pills po qam with breakfast x 1 wk then take 2 po qam with breakfast x 1 wk then take 1 po qam with breakfast x 1 wk    pregabalin (LYRICA) 50 MG capsule Take 1 capsule (50 mg total) by mouth 2 (two) times daily.    triamcinolone acetonide 0.1% (KENALOG) 0.1 % cream AAA bid to affected areas on arms/legs for up to 4 weeks per course     No current facility-administered medications for this visit.         ROS  Review of Systems   Constitutional:  Negative for chills, diaphoresis, fatigue and fever.   Respiratory:  Negative for chest tightness, shortness of breath, wheezing and stridor.    Cardiovascular:  Negative for chest pain and leg swelling.   Gastrointestinal:  Negative for blood in stool, diarrhea, nausea and vomiting.   Endocrine: Negative for cold intolerance and heat intolerance.   Genitourinary:  Positive for urgency.  Negative for dysuria and hematuria.   Musculoskeletal:  Positive for arthralgias, back pain, myalgias, neck pain and neck stiffness. Negative for gait problem and joint swelling.   Skin:  Negative for rash.   Neurological:  Positive for weakness and numbness. Negative for tremors, seizures, speech difficulty, light-headedness and headaches.   Hematological:  Does not bruise/bleed easily.   Psychiatric/Behavioral:  Negative for agitation, confusion and suicidal ideas. The patient is not nervous/anxious.           OBJECTIVE:  BP (!) 142/82   Pulse 75   Resp 17   Ht 5' (1.524 m)   Wt 101 kg (222 lb 10.6 oz)   BMI 43.49 kg/m²         Physical Exam  Constitutional:       General: She is not in acute distress.     Appearance: Normal appearance. She is not ill-appearing.   HENT:      Head: Normocephalic and atraumatic.      Nose: No congestion or rhinorrhea.   Eyes:      Extraocular Movements: Extraocular movements intact.      Pupils: Pupils are equal, round, and reactive to light.   Cardiovascular:      Pulses: Normal pulses.   Pulmonary:      Effort: Pulmonary effort is normal.   Abdominal:      General: Abdomen is flat.      Palpations: Abdomen is soft.   Musculoskeletal:      Cervical back: Normal range of motion and neck supple.   Skin:     General: Skin is warm and dry.      Capillary Refill: Capillary refill takes less than 2 seconds.   Neurological:      General: No focal deficit present.      Mental Status: She is alert and oriented to person, place, and time.      Cranial Nerves: No cranial nerve deficit.      Sensory: No sensory deficit.      Motor: Weakness present. No abnormal muscle tone.      Gait: Gait abnormal.      Deep Tendon Reflexes: Babinski sign absent on the right side. Babinski sign absent on the left side.      Reflex Scores:       Tricep reflexes are 2+ on the right side and 2+ on the left side.       Bicep reflexes are 2+ on the right side and 2+ on the left side.       Brachioradialis  reflexes are 2+ on the right side and 2+ on the left side.       Patellar reflexes are 2+ on the right side and 2+ on the left side.       Achilles reflexes are 1+ on the right side and 1+ on the left side.     Comments: 4/5 strength in right knee extension and knee flexion.  Otherwise, 5/5 strength in muscle groups of bilateral lower and upper extremities     Psychiatric:         Mood and Affect: Mood normal.         Behavior: Behavior normal.         Thought Content: Thought content normal.         Musculoskeletal:    Cervical Exam  Incision: no  Pain with Flexion: no  Pain with Extension: yes  Paraspinous TTP:  Left-greater-than-right  Facet TTP:  C5-C6  Spurling:  Negative bilaterally  ROM:  Improved compared to prior exam    Lumbar Exam  Incision: no  Pain with Flexion: no  Pain with Extension: yes  ROM:  Improved compared to prior exam  Paraspinous TTP:  Right greater than left  Facet TTP:  L4-L5  Facet Loading:  Positive bilaterally  SLR:  Positive on the right at 80° in L4 distribution  SIJ TTP:  Negative bilaterally  CHRISTI:  Negative bilateraly      LABS:  Lab Results   Component Value Date    WBC 6.79 12/27/2007    HGB 13.2 12/27/2007    HCT 41.0 12/27/2007    .5 (H) 12/27/2007     12/27/2007       CMP  Sodium   Date Value Ref Range Status   09/11/2007 142 136 - 145 mMol/l Final     Potassium   Date Value Ref Range Status   09/11/2007 3.8 3.3 - 5.3 mMol/l Final     Chloride   Date Value Ref Range Status   09/11/2007 107 95 - 110 mMol/l Final     CO2   Date Value Ref Range Status   09/11/2007 27 23.0 - 29.0 mEq/L Final     Glucose   Date Value Ref Range Status   09/11/2007 88 70 - 110 mg/dl Final     BUN   Date Value Ref Range Status   09/11/2007 15 5 - 23 mg/dl Final     Creatinine   Date Value Ref Range Status   09/11/2007 0.7 0.5 - 1.4 mg/dl Final     Calcium   Date Value Ref Range Status   09/11/2007 9.1 8.7 - 10.5 mg/dl Final     Total Protein   Date Value Ref Range Status   06/27/2007  6.8 6.0 - 8.4 gm/dl Final     Albumin   Date Value Ref Range Status   06/27/2007 4.4 3.5 - 5.2 g/dl Final     Total Bilirubin   Date Value Ref Range Status   06/27/2007 0.3 0.1 - 1.0 mg/dl Final     Comment:     These are the guidelines recommended by the .  Especially  for infants and newborns, interpretation of results should be based  on gestational age, weight and in agreement with clinical  observations.  .  Premature Infant recommended reference range (Bilirubin, Total)  Up to 24 hours.............<8.0 mg/dl  Up to 48 hours............<12.0 mg/dl  3-5 days..................<15.0 mg/dl  6-29 days.................<15.0 mg/dl       Alkaline Phosphatase   Date Value Ref Range Status   06/27/2007 66 45 - 130 U/L Final     AST   Date Value Ref Range Status   12/27/2007 20 0 - 31 U/L Final     ALT   Date Value Ref Range Status   12/27/2007 25 0 - 31 U/L Final       Lab Results   Component Value Date    HGBA1C 5.4 06/20/2006             ASSESSMENT:       67 y.o. year old female with lower back and neck pain, consistent with     1. Lumbar radiculopathy  IR DAISY Lumbar w/ Img    Case Request-RAD/Other Procedure Area: Lumbar L4/L5 IL DAISY    CANCELED: IR DAISY Lumbar w/ Img    CANCELED: Case Request-RAD/Other Procedure Area: Lumbar L5/S1 IL DAISY        Lumbar radiculopathy  -     Cancel: IR DAISY Lumbar w/ Img; Future; Expected date: 02/07/2023  -     Cancel: Case Request-RAD/Other Procedure Area: Lumbar L5/S1 IL DAISY  -     IR DAISY Lumbar w/ Img; Future; Expected date: 02/07/2023  -     Case Request-RAD/Other Procedure Area: Lumbar L4/L5 IL DAISY           PLAN:   - Interventions:   Schedule patient for L4-5 interlaminar epidural steroid injection for lumbar radiculopathy  - Anticoagulation use:   no no anticoagulation    - Medications:   Continue Lyrica 50 mg twice a day   Continue Relafen 750 mg twice a day as needed    - Therapy:    Continue formal physical therapy for neck and lower back pain    -  Imaging/Diagnostic:   X-rays of lumbar and cervical spine reviewed and findings discussed with patient.     MRI of lumbar spine reviewed and findings discussed with patient.  Significant for loss of disc height at L2-3 and L4-5 with mild foraminal stenosis at both levels.    - Consults:   None at this time        - Patient Questions: Answered all of the patient's questions regarding diagnosis, therapy, and treatment    - Follow up visit: return to clinic in 4 weeks after procedure        The above plan and management options were discussed at length with patient. Patient is in agreement with the above and verbalized understanding.    I discussed the goals of interventional chronic pain management with the patient on today's visit.  I explained the utility of injections for diagnostic and therapeutic purposes.  We discussed a multimodal approach to pain including treating the patient's given worst pain at any given time.  We will use a systematic approach to addressing pain.  We will also adopt a multimodal approach that includes injections, adjuvant medications, physical therapy, at times psychiatry.  There may be a limited role for opioid use intermittently in the treatment of pain, more particularly for acute pain although no one approach can be used as a sole treatment modality.    I emphasized the importance of regular exercise, core strengthening and stretching, diet and weight loss as a cornerstone of long-term pain management.      Stefan Torres MD  Interventional Pain Management  [facility]    Disclaimer:  This note was prepared using voice recognition system and is likely to have sound alike errors that may have been overlooked even after proof reading.  Please call me with any questions

## 2023-02-08 ENCOUNTER — CLINICAL SUPPORT (OUTPATIENT)
Dept: REHABILITATION | Facility: HOSPITAL | Age: 68
End: 2023-02-08
Payer: MEDICARE

## 2023-02-08 DIAGNOSIS — M25.60 DECREASED RANGE OF MOTION WITH DECREASED STRENGTH: ICD-10-CM

## 2023-02-08 DIAGNOSIS — R53.1 DECREASED RANGE OF MOTION WITH DECREASED STRENGTH: ICD-10-CM

## 2023-02-08 DIAGNOSIS — M54.12 CERVICAL RADICULOPATHY: ICD-10-CM

## 2023-02-08 DIAGNOSIS — M54.16 LUMBAR RADICULOPATHY: Primary | ICD-10-CM

## 2023-02-08 PROCEDURE — 97110 THERAPEUTIC EXERCISES: CPT

## 2023-02-08 PROCEDURE — 97140 MANUAL THERAPY 1/> REGIONS: CPT

## 2023-02-08 NOTE — PROGRESS NOTES
[facility]   Physical Therapy Treatment Note     Name: Jeannette Davis  Clinic Number: [id number]    Therapy Diagnosis:   Encounter Diagnoses   Name Primary?    Lumbar radiculopathy Yes    Cervical radiculopathy     Decreased range of motion with decreased strength      Physician: Stefan Torres MD  Visit Date: 2/8/2023  Physician Orders: PT Eval and Treat  Medical Diagnosis from Referral: Lumbar radiculopathy  Evaluation Date: 1/18/2023  Authorization Period Expiration: 01/10/24  Plan of Care Expiration: 03/19/2023                          Progress Update: 02/18/2023                        FOTO: 1 / 3    Visit # / Visits authorized: 5/20                         PRECAUTIONS: Standard Precautions     PTA Visit #: 0/5     Time In: 1200  Time Out: 1245  Total Billable Time: 45 minutes (Billing reflects 1 on 1 treatment time spent with patient)     SUBJECTIVE     Patient reports: she was sore after last visit. She states she started having decreased back pain last night. Patient is scheduled to have injections in low back later this month to help reduce pain.   She was compliant with home exercise program.    Response to previous treatment: sore after evaluation with exercises following evaluation    Functional change: none noted    Pain: 5/10     Location: right low back/hip    OBJECTIVE     Objective Measures updated at progress report only unless specified.       TREATMENT     Jeannette received the treatments listed below:     MANUAL THERAPY TECHNIQUES were applied for (10) minutes, including:    Manual Intervention Performed Today    Soft Tissue Mobilization []  Glute med/ QLs   Joint Mobilizations [x] Hip distraction, lumbar distraction     []     []    Functional Dry Needling  []      Plan for Next Visit: Continue as needed     THERAPEUTIC EXERCISES to develop strength, endurance, ROM, flexibility, posture, and core stabilization for (30) minutes including:    Intervention  Performed Today    Nustep/Recumbent Bike for endurance x 8 minutes   Anterior/posterior pelvic tilt  3 minutes    Sitting neutral posture march in place  1 minute x 2    Sitting neutral posture long arch quad   1 minute x 2    Seated hip adduction x 3 minutes    Sidelying hip adduction     Sidelying hip abduction  1 min x 3   Lumbar roll outs x 10s 10x 3 way   Hamstring stretch x Therapist 3x30s each   Lower trunk rotations x 3 minutes   Seated marches with ab brace x 3 minutes   Long arc quads x 3 minutes        Seated hip abduction belt x 3 minutes     Plan for Next Visit:        PATIENT EDUCATION AND HOME EXERCISES     Home Exercises Provided and Patient Education Provided     Education provided: (during session) minutes  PURPOSE: Patient educated on the impairments noted above and the effects of physical therapy intervention to improve overall condition and QOL.   EXERCISE: Patient was educated on all the above exercise prior/during/after for proper posture, positioning, and execution for safe performance with home exercise program.   STRENGTH: Patient educated on the importance of improved core and extremity strength in order to improve alignment of the spine and extremities with static positions and dynamic movement.     Written Home Exercises Provided: continue prior home exercise program  Exercises were reviewed and Jeannette was able to demonstrate them prior to the end of the session.  Jeannette demonstrated good  understanding of the education provided. See EMR under Patient Instructions for exercises provided during therapy sessions.    ASSESSMENT     Jeannette EUFEMIA Davis tolerated PT session well with minimal complaints of pain or discomfort, secondary to poor motor coordination and decrease muscle strength. Objective findings are improving with measurements of ROM and functional mobility. Therapy exercises were reviewed by revisiting exercises given from previous home exercise program while adding core  activation and stabilization activities. All activities performed in seated today, patient with no reports of increased pain in this position. Handouts were not issued during today's visit. Jeannette demonstrated good understanding of new exercises and will continue to progress at home until next follow-up.       Jeannette is progressing well towards her goals.   Pt prognosis is Excellent.     Pt will continue to benefit from skilled outpatient physical therapy to address the deficits listed in the problem list box on initial evaluation, provide pt/family education and to maximize pt's level of independence in the home and community environment.     Pt's spiritual, cultural and educational needs considered and pt agreeable to plan of care and goals.     Anticipated Barriers for therapy: sedentary lifestyle and chronicity of condition    GOALS:  SHORT TERM GOALS: 4 weeks, (02/18/23) 1/18/2023   Recent signs and systems trend is improving in order to progress towards LTG's.     Patient will be independent with HEP in order to further progress and return to maximal function.     Pain rating at Worst: 5/10 in order to progress towards increased independence with activity.     Patient will be able to correct postural deviations in sitting and standing, to decrease pain and promote postural awareness for injury prevention.         LONG TERM GOALS: 8 weeks, (03/19/23) 1/18/2023   Patient will return to normal ADL, recreational, and work related activities with less pain and limitation.      Patient will improve AROM to stated goals in order to return to maximal functional potential.      Patient will improve Strength to stated goals of appropriate musculature in order to improve functional independence.      Pain Rating at Best: 1/10 to improve Quality of Life.      Patient will meet predicted functional outcome (FOTO) score: 42% to increase self-worth & perceived functional ability.     Patient will have met/partially met  personal goal of: able to navigate 30 steps independently with minimal pain (> 2/10 on pain scale)            PLAN     Continue Plan of Care (POC) and progress per patient tolerance. See treatment section for details on planned progressions next session.    Darnell Herman, PT DPT

## 2023-02-10 ENCOUNTER — CLINICAL SUPPORT (OUTPATIENT)
Dept: REHABILITATION | Facility: HOSPITAL | Age: 68
End: 2023-02-10
Payer: MEDICARE

## 2023-02-10 DIAGNOSIS — M25.60 DECREASED RANGE OF MOTION WITH DECREASED STRENGTH: ICD-10-CM

## 2023-02-10 DIAGNOSIS — R53.1 DECREASED RANGE OF MOTION WITH DECREASED STRENGTH: ICD-10-CM

## 2023-02-10 DIAGNOSIS — M54.12 CERVICAL RADICULOPATHY: ICD-10-CM

## 2023-02-10 DIAGNOSIS — M54.16 LUMBAR RADICULOPATHY: Primary | ICD-10-CM

## 2023-02-10 PROCEDURE — 97110 THERAPEUTIC EXERCISES: CPT

## 2023-02-10 PROCEDURE — 97140 MANUAL THERAPY 1/> REGIONS: CPT

## 2023-02-10 NOTE — PROGRESS NOTES
[facility]   Physical Therapy Treatment Note     Name: Jeannette Davis  Clinic Number: [id number]    Therapy Diagnosis:   Encounter Diagnoses   Name Primary?    Lumbar radiculopathy Yes    Cervical radiculopathy     Decreased range of motion with decreased strength      Physician: Stefan Torres MD  Visit Date: 2/10/2023  Physician Orders: PT Eval and Treat  Medical Diagnosis from Referral: Lumbar radiculopathy  Evaluation Date: 1/18/2023  Authorization Period Expiration: 01/10/24  Plan of Care Expiration: 03/19/2023                          Progress Update: 02/18/2023                        FOTO: 1 / 3    Visit # / Visits authorized: 6/20                         PRECAUTIONS: Standard Precautions     PTA Visit #: 0/5     Time In: 1120  Time Out: 1200  Total Billable Time: 45 minutes (Billing reflects 1 on 1 treatment time spent with patient)     SUBJECTIVE     Patient reports:She states she started having decreased back pain last night. Patient is scheduled to have injections in low back later this month to help reduce pain.   She was compliant with home exercise program.    Response to previous treatment: sore after evaluation with exercises following evaluation    Functional change: none noted    Pain: 5/10     Location: right low back/hip    OBJECTIVE     Objective Measures updated at progress report only unless specified.       TREATMENT     Jeannette received the treatments listed below:     MANUAL THERAPY TECHNIQUES were applied for (10) minutes, including:    Manual Intervention Performed Today    Soft Tissue Mobilization []  Glute med/ QLs   Joint Mobilizations [x] Hip distraction, lumbar distraction     []     []    Functional Dry Needling  []      Plan for Next Visit: Continue as needed     THERAPEUTIC EXERCISES to develop strength, endurance, ROM, flexibility, posture, and core stabilization for (30) minutes including:    Intervention Performed Today    Nustep/Recumbent  Bike for endurance x 8 minutes   Anterior/posterior pelvic tilt  3 minutes    Sitting neutral posture march in place  1 minute x 2    Sitting neutral posture long arch quad   1 minute x 2    Seated hip adduction x 3 minutes    Sidelying hip adduction     Sidelying hip abduction  1 min x 3   Lumbar roll outs x 10s 10x 3 way   Hamstring stretch x Therapist 3x30s each   Lower trunk rotations x 3 minutes   Seated marches with ab brace x 3 minutes   Long arc quads x 3 minutes        Seated hip abduction belt x 3 minutes     Plan for Next Visit:        PATIENT EDUCATION AND HOME EXERCISES     Home Exercises Provided and Patient Education Provided     Education provided: (during session) minutes  PURPOSE: Patient educated on the impairments noted above and the effects of physical therapy intervention to improve overall condition and QOL.   EXERCISE: Patient was educated on all the above exercise prior/during/after for proper posture, positioning, and execution for safe performance with home exercise program.   STRENGTH: Patient educated on the importance of improved core and extremity strength in order to improve alignment of the spine and extremities with static positions and dynamic movement.     Written Home Exercises Provided: continue prior home exercise program  Exercises were reviewed and Jeannette was able to demonstrate them prior to the end of the session.  Jeannette demonstrated good  understanding of the education provided. See EMR under Patient Instructions for exercises provided during therapy sessions.    ASSESSMENT     Jeannette Davis tolerated PT session well with minimal complaints of pain or discomfort, secondary to poor motor coordination and decrease muscle strength. Objective findings are improving with measurements of ROM and functional mobility. Therapy exercises were reviewed by revisiting exercises given from previous home exercise program while adding core activation and stabilization  activities. All activities performed in seated today, patient with no reports of increased pain in this position. Handouts were not issued during today's visit. Jeannette demonstrated good understanding of new exercises and will continue to progress at home until next follow-up.       Jeannette is progressing well towards her goals.   Pt prognosis is Excellent.     Pt will continue to benefit from skilled outpatient physical therapy to address the deficits listed in the problem list box on initial evaluation, provide pt/family education and to maximize pt's level of independence in the home and community environment.     Pt's spiritual, cultural and educational needs considered and pt agreeable to plan of care and goals.     Anticipated Barriers for therapy: sedentary lifestyle and chronicity of condition    GOALS:  SHORT TERM GOALS: 4 weeks, (02/18/23) 1/18/2023   Recent signs and systems trend is improving in order to progress towards LTG's.     Patient will be independent with HEP in order to further progress and return to maximal function.     Pain rating at Worst: 5/10 in order to progress towards increased independence with activity.     Patient will be able to correct postural deviations in sitting and standing, to decrease pain and promote postural awareness for injury prevention.         LONG TERM GOALS: 8 weeks, (03/19/23) 1/18/2023   Patient will return to normal ADL, recreational, and work related activities with less pain and limitation.      Patient will improve AROM to stated goals in order to return to maximal functional potential.      Patient will improve Strength to stated goals of appropriate musculature in order to improve functional independence.      Pain Rating at Best: 1/10 to improve Quality of Life.      Patient will meet predicted functional outcome (FOTO) score: 42% to increase self-worth & perceived functional ability.     Patient will have met/partially met personal goal of: able to  navigate 30 steps independently with minimal pain (> 2/10 on pain scale)            PLAN     Continue Plan of Care (POC) and progress per patient tolerance. See treatment section for details on planned progressions next session.    Darnell Herman PT DPT

## 2023-02-14 NOTE — PRE-PROCEDURE INSTRUCTIONS
Spoke with patient regarding procedure scheduled on 2.22     Arrival time 0915     Has patient been sick with fever or on antibiotics within the last 7 days? No     Does the patient have any open wounds, sores or rashes? No     Does the patient have any recent fractures? no     Has patient received a vaccination within the last 7 days? No     Received the COVID vaccination?      Has the patient stopped all medications as directed? NA     Does patient have a pacemaker and or defibrillator? no     Does the patient have a ride to and from procedure and someone reliable to remain with patient?      Is the patient diabetic? no     Does the patient have sleep apnea? Or use O2 at home? No and no      Is the patient receiving sedation?      Is the patient instructed to remain NPO beginning at midnight the night before their procedure? yes     Procedure location confirmed with patient? Yes     Covid- Denies signs/symptoms. Instructed to notify PAT/MD if any changes.

## 2023-02-15 ENCOUNTER — CLINICAL SUPPORT (OUTPATIENT)
Dept: REHABILITATION | Facility: HOSPITAL | Age: 68
End: 2023-02-15
Payer: MEDICARE

## 2023-02-15 DIAGNOSIS — M54.12 CERVICAL RADICULOPATHY: ICD-10-CM

## 2023-02-15 DIAGNOSIS — M54.16 LUMBAR RADICULOPATHY: Primary | ICD-10-CM

## 2023-02-15 DIAGNOSIS — M25.60 DECREASED RANGE OF MOTION WITH DECREASED STRENGTH: ICD-10-CM

## 2023-02-15 DIAGNOSIS — R53.1 DECREASED RANGE OF MOTION WITH DECREASED STRENGTH: ICD-10-CM

## 2023-02-15 PROCEDURE — 97140 MANUAL THERAPY 1/> REGIONS: CPT

## 2023-02-15 PROCEDURE — 97110 THERAPEUTIC EXERCISES: CPT

## 2023-02-15 NOTE — PROGRESS NOTES
[facility]   Physical Therapy Treatment Note     Name: Jeannette Davis  Clinic Number: [id number]    Therapy Diagnosis:   Encounter Diagnoses   Name Primary?    Lumbar radiculopathy Yes    Cervical radiculopathy     Decreased range of motion with decreased strength      Physician: Stefan Torres MD  Visit Date: 2/15/2023  Physician Orders: PT Eval and Treat  Medical Diagnosis from Referral: Lumbar radiculopathy  Evaluation Date: 1/18/2023  Authorization Period Expiration: 01/10/24  Plan of Care Expiration: 03/19/2023                          Progress Update: 02/18/2023                        FOTO: 1 / 3    Visit # / Visits authorized: 7/20                         PRECAUTIONS: Standard Precautions     PTA Visit #: 0/5     Time In: 1000  Time Out: 1045  Total Billable Time: 45 minutes   (Billing reflects 1 on 1 treatment time spent with patient)     SUBJECTIVE     Patient reports: Patient is scheduled to have injections in low back later this month to help reduce pain.   She was compliant with home exercise program.    Response to previous treatment: sore after evaluation with exercises following evaluation    Functional change: none noted    Pain: 5/10     Location: right low back/hip    OBJECTIVE     Objective Measures updated at progress report only unless specified.       TREATMENT     Jeannette received the treatments listed below:     MANUAL THERAPY TECHNIQUES were applied for (10) minutes, including:    Manual Intervention Performed Today    Soft Tissue Mobilization []  Glute med/ QLs   Joint Mobilizations [x] Hip distraction, lumbar distraction     []     []    Functional Dry Needling  []      Plan for Next Visit: Continue as needed     THERAPEUTIC EXERCISES to develop strength, endurance, ROM, flexibility, posture, and core stabilization for (30) minutes including:    Intervention Performed Today    Nustep/Recumbent Bike for endurance x 8 minutes   Anterior/posterior pelvic  tilt x 3 minutes    Sitting neutral posture march in place  1 minute x 2    Sitting neutral posture long arch quad   1 minute x 2    Seated hip adduction x 3 minutes    Sidelying hip adduction     Sidelying hip abduction x 30x (B) green band    Lumbar roll outs x 10s 10x 3 way   Hamstring stretch x Therapist 3x30s each   Lower trunk rotations x 3 minutes   Seated marches with ab brace x 3 minutes   Long arc quads  3 minutes        Seated hip abduction belt  3 minutes       Jeannette participated in neuromuscular re-education activities to improve: Balance, Coordination, Kinesthetic, Sense, Proprioception, and Posture for 0 minutes. The following activities were included:                                            Plan for Next Visit:        PATIENT EDUCATION AND HOME EXERCISES     Home Exercises Provided and Patient Education Provided     Education provided: (during session) minutes  PURPOSE: Patient educated on the impairments noted above and the effects of physical therapy intervention to improve overall condition and QOL.   EXERCISE: Patient was educated on all the above exercise prior/during/after for proper posture, positioning, and execution for safe performance with home exercise program.   STRENGTH: Patient educated on the importance of improved core and extremity strength in order to improve alignment of the spine and extremities with static positions and dynamic movement.     Written Home Exercises Provided: continue prior home exercise program  Exercises were reviewed and Jeannette was able to demonstrate them prior to the end of the session.  Jeannette demonstrated good  understanding of the education provided. See EMR under Patient Instructions for exercises provided during therapy sessions.    ASSESSMENT     Jeannette H Ryan tolerated PT session well with minimal complaints of pain or discomfort, secondary to poor motor coordination and decrease muscle strength. Objective findings are improving  with measurements of ROM and functional mobility. Therapy exercises were reviewed by revisiting exercises given from previous home exercise program while adding core activation and stabilization activities. All activities performed in seated today, patient with no reports of increased pain in this position. Handouts were not issued during today's visit. Jeannette demonstrated good understanding of new exercises and will continue to progress at home until next follow-up.       Jeannette is progressing well towards her goals.   Pt prognosis is Excellent.     Pt will continue to benefit from skilled outpatient physical therapy to address the deficits listed in the problem list box on initial evaluation, provide pt/family education and to maximize pt's level of independence in the home and community environment.     Pt's spiritual, cultural and educational needs considered and pt agreeable to plan of care and goals.     Anticipated Barriers for therapy: sedentary lifestyle and chronicity of condition    GOALS:  SHORT TERM GOALS: 4 weeks, (02/18/23) 1/18/2023   Recent signs and systems trend is improving in order to progress towards LTG's.     Patient will be independent with HEP in order to further progress and return to maximal function.     Pain rating at Worst: 5/10 in order to progress towards increased independence with activity.     Patient will be able to correct postural deviations in sitting and standing, to decrease pain and promote postural awareness for injury prevention.         LONG TERM GOALS: 8 weeks, (03/19/23) 1/18/2023   Patient will return to normal ADL, recreational, and work related activities with less pain and limitation.      Patient will improve AROM to stated goals in order to return to maximal functional potential.      Patient will improve Strength to stated goals of appropriate musculature in order to improve functional independence.      Pain Rating at Best: 1/10 to improve Quality of  Life.      Patient will meet predicted functional outcome (FOTO) score: 42% to increase self-worth & perceived functional ability.     Patient will have met/partially met personal goal of: able to navigate 30 steps independently with minimal pain (> 2/10 on pain scale)            PLAN     Continue Plan of Care (POC) and progress per patient tolerance. See treatment section for details on planned progressions next session.    Darnell Herman, PT DPT

## 2023-02-17 ENCOUNTER — CLINICAL SUPPORT (OUTPATIENT)
Dept: REHABILITATION | Facility: HOSPITAL | Age: 68
End: 2023-02-17
Payer: MEDICARE

## 2023-02-17 DIAGNOSIS — R53.1 DECREASED RANGE OF MOTION WITH DECREASED STRENGTH: ICD-10-CM

## 2023-02-17 DIAGNOSIS — M54.12 CERVICAL RADICULOPATHY: ICD-10-CM

## 2023-02-17 DIAGNOSIS — M54.16 LUMBAR RADICULOPATHY: Primary | ICD-10-CM

## 2023-02-17 DIAGNOSIS — M25.60 DECREASED RANGE OF MOTION WITH DECREASED STRENGTH: ICD-10-CM

## 2023-02-17 PROCEDURE — 97110 THERAPEUTIC EXERCISES: CPT | Mod: CQ

## 2023-02-17 NOTE — PROGRESS NOTES
[facility]   Physical Therapy Treatment Note     Name: Jeannette Davis  Clinic Number: [id number]    Therapy Diagnosis:   Encounter Diagnoses   Name Primary?    Lumbar radiculopathy Yes    Cervical radiculopathy     Decreased range of motion with decreased strength        Physician: Stefan Torres MD  Visit Date: 2/17/2023  Physician Orders: PT Eval and Treat  Medical Diagnosis from Referral: Lumbar radiculopathy  Evaluation Date: 1/18/2023  Authorization Period Expiration: 01/10/24  Plan of Care Expiration: 03/19/2023                          Progress Update: 02/18/2023                        FOTO: 1 / 3    Visit # / Visits authorized: 8/20                         PRECAUTIONS: Standard Precautions     PTA Visit #: 1/5     Time In: 1000  Time Out: 1045  Total Billable Time: 39 minutes   (Billing reflects 1 on 1 treatment time spent with patient)     SUBJECTIVE     Patient reports: Patient is scheduled to have injections on 2/22/2023 in low back later this month to help reduce pain. She did not take her pain medication this morning.     She was compliant with home exercise program but they are going slowly.    Response to previous treatment: sore after last treatment    Functional change: pain with bed mobility. Sitting and standing is difficult and decreased walking tolerance. Decreased sitting tolerance limited to 10 minutes. Unable to sleep on her right side.     Pain: 5/10     Location: right low back/hip    OBJECTIVE     Objective Measures updated at progress report only unless specified.       TREATMENT     Jeannette received the treatments listed below:     MANUAL THERAPY TECHNIQUES were applied for (0) minutes, including: declined 2/17/2023    Manual Intervention Performed Today    Soft Tissue Mobilization []  Glute med/ QLs   Joint Mobilizations [] Hip distraction, lumbar distraction     []     []    Functional Dry Needling  []      Plan for Next Visit: Continue as needed      THERAPEUTIC EXERCISES to develop strength, endurance, ROM, flexibility, posture, and core stabilization for (39) minutes including:    Intervention Performed Today    Nustep/Recumbent Bike for endurance x 8 minutes   posterior pelvic tilt x 3 minutes 3-4 second hold   Sitting neutral posture march in place x 1 minute x 2    Sitting neutral posture long arch quad  x 1 minute x 2    Supine hip adduction with posterior pelvic tilt  x 3 minutes    Supine hip adduction with posterior pelvic tilt      Sidelying hip abduction  30x (B) green band    +++ use red +++   clams x 3 x 10 small range of motion red band   Lumbar roll outs  10s 10x 3 way   Hamstring stretch  Therapist 3x30s each   Lower trunk rotations x 3 minutes   Seated marches with ab brace  3 minutes   Long arc quads  3 minutes   Bridges  x 3 x 8 small range of motion    Seated hip abduction belt  3 minutes       Jeannette participated in neuromuscular re-education activities to improve: Balance, Coordination, Kinesthetic, Sense, Proprioception, and Posture for 0 minutes. The following activities were included:                                            Plan for Next Visit:        PATIENT EDUCATION AND HOME EXERCISES     Home Exercises Provided and Patient Education Provided     Education provided: (during session) minutes  PURPOSE: Patient educated on the impairments noted above and the effects of physical therapy intervention to improve overall condition and QOL.   EXERCISE: Patient was educated on all the above exercise prior/during/after for proper posture, positioning, and execution for safe performance with home exercise program.   STRENGTH: Patient educated on the importance of improved core and extremity strength in order to improve alignment of the spine and extremities with static positions and dynamic movement.     Written Home Exercises Provided: continue prior home exercise program  Exercises were reviewed and Jeannette was able to demonstrate  them prior to the end of the session.  Jeannette demonstrated good  understanding of the education provided. See EMR under Patient Instructions for exercises provided during therapy sessions.    ASSESSMENT     Jeannette Davis tolerated PT session with complaints of fatigue. She participated with all exercises with slow progression. She appears to not be extremely motivated with performing her home exercise program but understands the importance of doing so. She opted to perform additional exercises in lieu  of manual therapy.     Jeannette is progressing well towards her goals.   Pt prognosis is Excellent.     Pt will continue to benefit from skilled outpatient physical therapy to address the deficits listed in the problem list box on initial evaluation, provide pt/family education and to maximize pt's level of independence in the home and community environment.     Pt's spiritual, cultural and educational needs considered and pt agreeable to plan of care and goals.     Anticipated Barriers for therapy: sedentary lifestyle and chronicity of condition    GOALS:  SHORT TERM GOALS: 4 weeks, (02/18/23) 1/18/2023   Recent signs and systems trend is improving in order to progress towards LTG's.     Patient will be independent with HEP in order to further progress and return to maximal function.     Pain rating at Worst: 5/10 in order to progress towards increased independence with activity.     Patient will be able to correct postural deviations in sitting and standing, to decrease pain and promote postural awareness for injury prevention.         LONG TERM GOALS: 8 weeks, (03/19/23) 1/18/2023   Patient will return to normal ADL, recreational, and work related activities with less pain and limitation.      Patient will improve AROM to stated goals in order to return to maximal functional potential.      Patient will improve Strength to stated goals of appropriate musculature in order to improve functional  independence.      Pain Rating at Best: 1/10 to improve Quality of Life.      Patient will meet predicted functional outcome (FOTO) score: 42% to increase self-worth & perceived functional ability.     Patient will have met/partially met personal goal of: able to navigate 30 steps independently with minimal pain (> 2/10 on pain scale)            PLAN     Continue Plan of Care (POC) and progress per patient tolerance. See treatment section for details on planned progressions next session.    Amos Dela Cruz, PTA

## 2023-02-22 ENCOUNTER — HOSPITAL ENCOUNTER (OUTPATIENT)
Facility: HOSPITAL | Age: 68
Discharge: HOME | End: 2023-02-22
Attending: ANESTHESIOLOGY | Admitting: ANESTHESIOLOGY
Payer: MEDICARE

## 2023-02-22 VITALS
OXYGEN SATURATION: 95 % | HEIGHT: 60 IN | WEIGHT: 220 LBS | RESPIRATION RATE: 18 BRPM | BODY MASS INDEX: 43.19 KG/M2 | SYSTOLIC BLOOD PRESSURE: 129 MMHG | DIASTOLIC BLOOD PRESSURE: 65 MMHG | HEART RATE: 58 BPM | TEMPERATURE: 97 F

## 2023-02-22 DIAGNOSIS — M54.16 LUMBAR RADICULOPATHY: Primary | ICD-10-CM

## 2023-02-22 PROCEDURE — 25500020 PHARM REV CODE 255: Performed by: ANESTHESIOLOGY

## 2023-02-22 PROCEDURE — 25000003 PHARM REV CODE 250: Performed by: ANESTHESIOLOGY

## 2023-02-22 PROCEDURE — 62323 NJX INTERLAMINAR LMBR/SAC: CPT | Performed by: ANESTHESIOLOGY

## 2023-02-22 PROCEDURE — 62323 PR INJ LUMBAR/SACRAL, W/IMAGING GUIDANCE: ICD-10-PCS | Mod: ,,, | Performed by: ANESTHESIOLOGY

## 2023-02-22 PROCEDURE — 62323 NJX INTERLAMINAR LMBR/SAC: CPT | Mod: ,,, | Performed by: ANESTHESIOLOGY

## 2023-02-22 PROCEDURE — 63600175 PHARM REV CODE 636 W HCPCS: Performed by: ANESTHESIOLOGY

## 2023-02-22 RX ORDER — ONDANSETRON 2 MG/ML
4 INJECTION INTRAMUSCULAR; INTRAVENOUS ONCE AS NEEDED
Status: DISCONTINUED | OUTPATIENT
Start: 2023-02-22 | End: 2023-02-22 | Stop reason: HOSPADM

## 2023-02-22 RX ORDER — LIDOCAINE HYDROCHLORIDE 10 MG/ML
INJECTION, SOLUTION EPIDURAL; INFILTRATION; INTRACAUDAL; PERINEURAL
Status: DISCONTINUED | OUTPATIENT
Start: 2023-02-22 | End: 2023-02-22 | Stop reason: HOSPADM

## 2023-02-22 RX ORDER — MIDAZOLAM HYDROCHLORIDE 1 MG/ML
INJECTION, SOLUTION INTRAMUSCULAR; INTRAVENOUS
Status: DISCONTINUED | OUTPATIENT
Start: 2023-02-22 | End: 2023-02-22 | Stop reason: HOSPADM

## 2023-02-22 RX ORDER — LATANOPROST 50 UG/ML
1 SOLUTION/ DROPS OPHTHALMIC DAILY
COMMUNITY

## 2023-02-22 RX ORDER — BETAMETHASONE SODIUM PHOSPHATE AND BETAMETHASONE ACETATE 3; 3 MG/ML; MG/ML
INJECTION, SUSPENSION INTRA-ARTICULAR; INTRALESIONAL; INTRAMUSCULAR; SOFT TISSUE
Status: DISCONTINUED | OUTPATIENT
Start: 2023-02-22 | End: 2023-02-22 | Stop reason: HOSPADM

## 2023-02-22 RX ORDER — FENTANYL CITRATE 50 UG/ML
INJECTION, SOLUTION INTRAMUSCULAR; INTRAVENOUS
Status: DISCONTINUED | OUTPATIENT
Start: 2023-02-22 | End: 2023-02-22 | Stop reason: HOSPADM

## 2023-02-22 NOTE — DISCHARGE SUMMARY
Discharge Note  Short Stay      SUMMARY     Admit Date: 2/22/2023    Attending Physician: Stefan Torres MD        Discharge Physician: Stefan Torres MD        Discharge Date: 2/22/2023 10:06 AM    Procedure(s) (LRB):  Lumbar L4/L5 IL DAISY (N/A)    Final Diagnosis: Lumbar radiculopathy [M54.16]    Disposition: Home or self care    Patient Instructions:   Current Discharge Medication List        CONTINUE these medications which have NOT CHANGED    Details   atorvastatin (LIPITOR) 20 MG tablet Take 20 mg by mouth once daily.      hydrochlorothiazide (HYDRODIURIL) 25 MG tablet Take 25 mg by mouth once daily.      latanoprost 0.005 % ophthalmic solution Place 1 drop into both eyes once daily.      losartan (COZAAR) 50 MG tablet Take 50 mg by mouth once daily.      metoprolol succinate (TOPROL-XL) 25 MG 24 hr tablet TAKE 1 TABLET BY MOUTH DAILY      predniSONE (DELTASONE) 20 MG tablet Take 3 pills po qam with breakfast x 1 wk then take 2 po qam with breakfast x 1 wk then take 1 po qam with breakfast x 1 wk  Qty: 42 tablet, Refills: 0    Associated Diagnoses: Pruritus; Disease of skin and subcutaneous tissue      pregabalin (LYRICA) 50 MG capsule Take 1 capsule (50 mg total) by mouth 2 (two) times daily.  Qty: 60 capsule, Refills: 1    Comments: Take 1 pill at night for 5 days.  Then take 1 pill at night and 1 pill in the morning.  Associated Diagnoses: DDD (degenerative disc disease), cervical; DDD (degenerative disc disease), lumbar; Cervical radiculopathy; Lumbar spondylosis; Lumbar radiculopathy; Cervical spondylosis; Dorsalgia, unspecified; Lumbar radiculopathy, chronic      clonazePAM (KLONOPIN) 0.5 MG tablet TAKE 1 TABLET BY MOUTH TWICE DAILY AS NEEDED FOR ANXIETY FOR UP TO 30 DAYS      estradiol (ESTRACE) 0.5 MG tablet Take 0.5 mg by mouth once daily.      hydrOXYzine HCl (ATARAX) 25 MG tablet Take 1 tablet (25 mg total) by mouth nightly as needed for Itching. Caution- can cause drowsiness  Qty: 30 tablet,  Refills: 0    Associated Diagnoses: Pruritus      loratadine (CLARITIN) 10 mg tablet Take 10 mg by mouth once daily.      mupirocin (BACTROBAN) 2 % ointment AAA three times a day  Qty: 30 g, Refills: 0    Associated Diagnoses: Disease of skin and subcutaneous tissue      nabumetone (RELAFEN) 750 MG tablet Take 1 tablet (750 mg total) by mouth 2 (two) times daily as needed for Pain.  Qty: 60 tablet, Refills: 1    Associated Diagnoses: DDD (degenerative disc disease), cervical; DDD (degenerative disc disease), lumbar; Cervical radiculopathy; Lumbar spondylosis; Lumbar radiculopathy; Cervical spondylosis; Dorsalgia, unspecified; Lumbar radiculopathy, chronic      triamcinolone acetonide 0.1% (KENALOG) 0.1 % cream AAA bid to affected areas on arms/legs for up to 4 weeks per course  Qty: 454 g, Refills: 3    Associated Diagnoses: Pruritus; Disease of skin and subcutaneous tissue                 Discharge Diagnosis: Lumbar radiculopathy [M54.16]  Condition on Discharge: Stable with no complications to procedure   Diet on Discharge: Same as before.  Activity: as per instruction sheet.  Discharge to: Home with a responsible adult.  Follow up: 2-4 weeks       Please call the office at [phone] if you experience any weakness or loss of sensation, fever > 101.5, pain uncontrolled with oral medications, persistent nausea/vomiting/or diarrhea, redness or drainage from the incisions, or any other worrisome concerns. If physician on call was not reached or could not communicate with our office for any reason please go to the nearest emergency department

## 2023-02-22 NOTE — OP NOTE
Lumbar Interlaminar Epidural Steroid Injection under Fluoroscopic Guidance.     INFORMED CONSENT: The procedure, risks, benefits and options were discussed with patient. There are no contraindications to the procedure. The patient expressed understanding and agreed to proceed. The personnel performing the procedure was discussed.    Date of procedure 02/22/2023    Time-out taken to identify patient and procedure side prior to starting the procedure.                     PROCEDURE:   L4/5 Interlaminar Epidural Steroid Injection Under Fluoroscopic Guidance.     Pre-Op diagnosis: Lumbar radiculopathy [M54.16]    Post-Op diagnosis: Lumbar radiculopathy [M54.16]    PHYSICIAN: Stefan Torres MD    ASSISTANTS: None     ESTIMATED BLOOD LOSS: none.     COMPLICATIONS: none.     SPECIMENS: none    Sedation: Conscious sedation provided by M.D    SEDATION MEDICATIONS: local/IV sedation: Versed 2 mg and fentanyl 50 mcg IV.  Conscious sedation ordered by MD.  Patient reevaluated and sedation administered by MD and monitored by RN.  Total sedation time was less than 10 min.      TECHNIQUE: With the patient laying in a prone position, the area was prepped and draped in the usual sterile fashion using ChloraPrep and a fenestrated drape. 1% lidocaine was given using a 27-gauge needle by raising a wheal and going down to the hub of the needle over the L4/5 interlaminar space.  The interlaminar space was then approached with a 3.5 inch 18-gauge Touhy needle was introduced under fluoroscopic guidance in the AP and Lateral view. Once the Ligamentum flavum was encountered loss of resistance to saline was used to enter the epidural space. With positive loss of resistance and negative CSF or Blood, 3mL contrast dye Omnipaque (300mg/ml) was injected to confirm placement and there was no vascular runoff. 2ml of Betamethasone PF 6mg/ml and 3ml of Lidocaine PF 1%. Displacement of the radiopaque contrast after injection of the medication  confirmed that the medication went into the area of the epidural space.  The patient tolerated the procedure well.       The patient was monitored for approximately 30 minutes after the procedure.  Patient was given post procedure and discharge instructions to follow at home.  The patient was discharged in a stable condition

## 2023-02-22 NOTE — DISCHARGE INSTRUCTIONS

## 2023-02-24 ENCOUNTER — CLINICAL SUPPORT (OUTPATIENT)
Dept: REHABILITATION | Facility: HOSPITAL | Age: 68
End: 2023-02-24
Payer: MEDICARE

## 2023-02-24 DIAGNOSIS — M25.60 DECREASED RANGE OF MOTION WITH DECREASED STRENGTH: ICD-10-CM

## 2023-02-24 DIAGNOSIS — R53.1 DECREASED RANGE OF MOTION WITH DECREASED STRENGTH: ICD-10-CM

## 2023-02-24 DIAGNOSIS — M54.16 LUMBAR RADICULOPATHY: Primary | ICD-10-CM

## 2023-02-24 DIAGNOSIS — M54.12 CERVICAL RADICULOPATHY: ICD-10-CM

## 2023-02-24 PROCEDURE — 97110 THERAPEUTIC EXERCISES: CPT

## 2023-02-24 PROCEDURE — 97140 MANUAL THERAPY 1/> REGIONS: CPT

## 2023-02-27 ENCOUNTER — CLINICAL SUPPORT (OUTPATIENT)
Dept: REHABILITATION | Facility: HOSPITAL | Age: 68
End: 2023-02-27
Payer: MEDICARE

## 2023-02-27 DIAGNOSIS — M54.16 LUMBAR RADICULOPATHY: Primary | ICD-10-CM

## 2023-02-27 DIAGNOSIS — M25.60 DECREASED RANGE OF MOTION WITH DECREASED STRENGTH: ICD-10-CM

## 2023-02-27 DIAGNOSIS — M54.12 CERVICAL RADICULOPATHY: ICD-10-CM

## 2023-02-27 DIAGNOSIS — R53.1 DECREASED RANGE OF MOTION WITH DECREASED STRENGTH: ICD-10-CM

## 2023-02-27 PROCEDURE — 97140 MANUAL THERAPY 1/> REGIONS: CPT

## 2023-02-27 PROCEDURE — 97110 THERAPEUTIC EXERCISES: CPT

## 2023-02-27 NOTE — PROGRESS NOTES
" [facility]   Physical Therapy Treatment Note     Name: Jeannette Davis  Clinic Number: [id number]    Therapy Diagnosis:   Encounter Diagnoses   Name Primary?    Lumbar radiculopathy Yes    Cervical radiculopathy     Decreased range of motion with decreased strength      Physician: Stefan Torres MD  Visit Date: 2/27/2023  Physician Orders: PT Eval and Treat  Medical Diagnosis from Referral: Lumbar radiculopathy  Evaluation Date: 1/18/2023  Authorization Period Expiration: 01/10/24  Plan of Care Expiration: 03/19/2023                          Progress Update: 02/18/2023                        FOTO: 1 / 3    Visit # / Visits authorized: 10/20                         PRECAUTIONS: Standard Precautions     PTA Visit #: 0/5     Time In: 1115  Time Out: 1200  Total Billable Time: 45 minutes   (Billing reflects 1 on 1 treatment time spent with patient)     SUBJECTIVE     Patient reports: still feeling "off" after having injections.    She was compliant with home exercise program.    Response to previous treatment: sore after evaluation with exercises following evaluation    Functional change: none noted    Pain: 3/10     Location: right low back/hip    OBJECTIVE     Objective Measures updated at progress report only unless specified.     TREATMENT     Jeannette received the treatments listed below:     MANUAL THERAPY TECHNIQUES were applied for (10) minutes, including:    Manual Intervention Performed Today    Soft Tissue Mobilization []  Glute med/ QLs   Joint Mobilizations [x] Hip distraction, lumbar distraction     []     []    Functional Dry Needling  []      Plan for Next Visit: Continue as needed     THERAPEUTIC EXERCISES to develop strength, endurance, ROM, flexibility, posture, and core stabilization for (30) minutes including:    Intervention Performed Today    Nustep/Recumbent Bike for endurance x 8 minutes   Anterior/posterior pelvic tilt x 3 minutes    Sitting neutral posture " march in place  1 minute x 2    Sitting neutral posture long arch quad   1 minute x 2    Seated hip adduction x 3 minutes    Sidelying hip adduction     Sidelying hip abduction x 30x (B) green band    Lumbar roll outs x 10s 10x 3 way   Hamstring stretch x Therapist 3x30s each   Lower trunk rotations x 3 minutes   Seated marches with ab brace x 3 minutes   Long arc quads  3 minutes        Seated hip abduction belt  3 minutes       Jeannette participated in neuromuscular re-education activities to improve: Balance, Coordination, Kinesthetic, Sense, Proprioception, and Posture for 0 minutes. The following activities were included:                                            Plan for Next Visit:        PATIENT EDUCATION AND HOME EXERCISES     Home Exercises Provided and Patient Education Provided     Education provided: (during session) minutes  PURPOSE: Patient educated on the impairments noted above and the effects of physical therapy intervention to improve overall condition and QOL.   EXERCISE: Patient was educated on all the above exercise prior/during/after for proper posture, positioning, and execution for safe performance with home exercise program.   STRENGTH: Patient educated on the importance of improved core and extremity strength in order to improve alignment of the spine and extremities with static positions and dynamic movement.     Written Home Exercises Provided: continue prior home exercise program  Exercises were reviewed and Jeannette was able to demonstrate them prior to the end of the session.  Jeannette demonstrated good  understanding of the education provided. See EMR under Patient Instructions for exercises provided during therapy sessions.    ASSESSMENT     Jeannette Davis tolerated PT session well with minimal complaints of pain or discomfort, secondary to poor motor coordination and decrease muscle strength. Objective findings are improving with measurements of ROM and functional  mobility. Therapy exercises were reviewed by revisiting exercises given from previous home exercise program while adding core activation and stabilization activities. All activities performed in seated today, patient with no reports of increased pain in this position. Handouts were not issued during today's visit. Jeannette demonstrated good understanding of new exercises and will continue to progress at home until next follow-up.       Jeannette is progressing well towards her goals.   Pt prognosis is Excellent.     Pt will continue to benefit from skilled outpatient physical therapy to address the deficits listed in the problem list box on initial evaluation, provide pt/family education and to maximize pt's level of independence in the home and community environment.     Pt's spiritual, cultural and educational needs considered and pt agreeable to plan of care and goals.     Anticipated Barriers for therapy: sedentary lifestyle and chronicity of condition    GOALS:  SHORT TERM GOALS: 4 weeks, (02/18/23) 1/18/2023   Recent signs and systems trend is improving in order to progress towards LTG's.     Patient will be independent with HEP in order to further progress and return to maximal function.     Pain rating at Worst: 5/10 in order to progress towards increased independence with activity.     Patient will be able to correct postural deviations in sitting and standing, to decrease pain and promote postural awareness for injury prevention.         LONG TERM GOALS: 8 weeks, (03/19/23) 1/18/2023   Patient will return to normal ADL, recreational, and work related activities with less pain and limitation.      Patient will improve AROM to stated goals in order to return to maximal functional potential.      Patient will improve Strength to stated goals of appropriate musculature in order to improve functional independence.      Pain Rating at Best: 1/10 to improve Quality of Life.      Patient will meet predicted  functional outcome (FOTO) score: 42% to increase self-worth & perceived functional ability.     Patient will have met/partially met personal goal of: able to navigate 30 steps independently with minimal pain (> 2/10 on pain scale)            PLAN     Continue Plan of Care (POC) and progress per patient tolerance. See treatment section for details on planned progressions next session.    Darnell Herman, PT DPT

## 2023-02-27 NOTE — PROGRESS NOTES
[facility]   Physical Therapy Treatment Note     Name: Jeannette Davis  Clinic Number: [id number]    Therapy Diagnosis:   Encounter Diagnoses   Name Primary?    Lumbar radiculopathy Yes    Cervical radiculopathy     Decreased range of motion with decreased strength      Physician: Stefan Torres MD  Visit Date: 2/24/2023  Physician Orders: PT Eval and Treat  Medical Diagnosis from Referral: Lumbar radiculopathy  Evaluation Date: 1/18/2023  Authorization Period Expiration: 01/10/24  Plan of Care Expiration: 03/19/2023                          Progress Update: 02/18/2023                        FOTO: 1 / 3    Visit # / Visits authorized: 9/20                         PRECAUTIONS: Standard Precautions     PTA Visit #: 0/5     Time In: 1030  Time Out: 1115  Total Billable Time: 45 minutes   (Billing reflects 1 on 1 treatment time spent with patient)     SUBJECTIVE     Patient reports: Patient is scheduled  injections improved low back pain. She was compliant with home exercise program.    Response to previous treatment: sore after evaluation with exercises following evaluation    Functional change: none noted    Pain: 4/10     Location: right low back/hip    OBJECTIVE     Objective Measures updated at progress report only unless specified.       TREATMENT     Jeannette received the treatments listed below:     MANUAL THERAPY TECHNIQUES were applied for (10) minutes, including:    Manual Intervention Performed Today    Soft Tissue Mobilization []  Glute med/ QLs   Joint Mobilizations [x] Hip distraction, lumbar distraction     []     []    Functional Dry Needling  []      Plan for Next Visit: Continue as needed     THERAPEUTIC EXERCISES to develop strength, endurance, ROM, flexibility, posture, and core stabilization for (30) minutes including:    Intervention Performed Today    Nustep/Recumbent Bike for endurance x 8 minutes   Anterior/posterior pelvic tilt x 3 minutes    Sitting  neutral posture march in place  1 minute x 2    Sitting neutral posture long arch quad   1 minute x 2    Seated hip adduction x 3 minutes    Sidelying hip adduction     Sidelying hip abduction x 30x (B) green band    Lumbar roll outs x 10s 10x 3 way   Hamstring stretch x Therapist 3x30s each   Lower trunk rotations x 3 minutes   Seated marches with ab brace x 3 minutes   Long arc quads  3 minutes        Seated hip abduction belt  3 minutes       Jeannette participated in neuromuscular re-education activities to improve: Balance, Coordination, Kinesthetic, Sense, Proprioception, and Posture for 0 minutes. The following activities were included:                                            Plan for Next Visit:        PATIENT EDUCATION AND HOME EXERCISES     Home Exercises Provided and Patient Education Provided     Education provided: (during session) minutes  PURPOSE: Patient educated on the impairments noted above and the effects of physical therapy intervention to improve overall condition and QOL.   EXERCISE: Patient was educated on all the above exercise prior/during/after for proper posture, positioning, and execution for safe performance with home exercise program.   STRENGTH: Patient educated on the importance of improved core and extremity strength in order to improve alignment of the spine and extremities with static positions and dynamic movement.     Written Home Exercises Provided: continue prior home exercise program  Exercises were reviewed and Jeannette was able to demonstrate them prior to the end of the session.  Jeannette demonstrated good  understanding of the education provided. See EMR under Patient Instructions for exercises provided during therapy sessions.    ASSESSMENT     Jeannette Davis tolerated PT session well with minimal complaints of pain or discomfort, secondary to poor motor coordination and decrease muscle strength. Objective findings are improving with measurements of ROM and  functional mobility. Therapy exercises were reviewed by revisiting exercises given from previous home exercise program while adding core activation and stabilization activities. All activities performed in seated today, patient with no reports of increased pain in this position. Handouts were not issued during today's visit. Jeannette demonstrated good understanding of new exercises and will continue to progress at home until next follow-up.       Jeannette is progressing well towards her goals.   Pt prognosis is Excellent.     Pt will continue to benefit from skilled outpatient physical therapy to address the deficits listed in the problem list box on initial evaluation, provide pt/family education and to maximize pt's level of independence in the home and community environment.     Pt's spiritual, cultural and educational needs considered and pt agreeable to plan of care and goals.     Anticipated Barriers for therapy: sedentary lifestyle and chronicity of condition    GOALS:  SHORT TERM GOALS: 4 weeks, (02/18/23) 1/18/2023   Recent signs and systems trend is improving in order to progress towards LTG's.     Patient will be independent with HEP in order to further progress and return to maximal function.     Pain rating at Worst: 5/10 in order to progress towards increased independence with activity.     Patient will be able to correct postural deviations in sitting and standing, to decrease pain and promote postural awareness for injury prevention.         LONG TERM GOALS: 8 weeks, (03/19/23) 1/18/2023   Patient will return to normal ADL, recreational, and work related activities with less pain and limitation.      Patient will improve AROM to stated goals in order to return to maximal functional potential.      Patient will improve Strength to stated goals of appropriate musculature in order to improve functional independence.      Pain Rating at Best: 1/10 to improve Quality of Life.      Patient will meet  predicted functional outcome (FOTO) score: 42% to increase self-worth & perceived functional ability.     Patient will have met/partially met personal goal of: able to navigate 30 steps independently with minimal pain (> 2/10 on pain scale)            PLAN     Continue Plan of Care (POC) and progress per patient tolerance. See treatment section for details on planned progressions next session.    Darnell Herman, PT DPT

## 2023-03-16 ENCOUNTER — TELEPHONE (OUTPATIENT)
Dept: PAIN MEDICINE | Facility: CLINIC | Age: 68
End: 2023-03-16
Payer: MEDICARE

## 2023-03-16 DIAGNOSIS — M54.16 LUMBAR RADICULOPATHY: Primary | ICD-10-CM

## 2023-03-16 RX ORDER — TOPIRAMATE 25 MG/1
50 TABLET ORAL NIGHTLY
Qty: 60 TABLET | Refills: 1 | Status: SHIPPED | OUTPATIENT
Start: 2023-03-16

## 2023-03-16 NOTE — TELEPHONE ENCOUNTER
Called pt. Informed her Topamax medication was sent to the pharmacy. She can stop lyrica medication today.  All questions answered.

## 2023-03-16 NOTE — TELEPHONE ENCOUNTER
----- Message from Adonis Lew sent at 3/16/2023  1:59 PM CDT -----  Pt is calling to see If something other than what was called in recently be called in    As the meds have a side effect of weight gain      Pls call her back at [phone]    Thank you    Jin

## 2023-03-16 NOTE — TELEPHONE ENCOUNTER
Returned pt call. Pt states that she would like to an alternative mediation that does not cause weight gain. Informed pt that I will send this information over to Dr. Torres.

## 2023-03-16 NOTE — TELEPHONE ENCOUNTER
----- Message from Stefan Torres MD sent at 3/16/2023  3:22 PM CDT -----  Topamax medication called into pharmacy. Patient can stop lyrica medication. She does not have to wean off.   ----- Message -----  From: Claudia Turner LPN  Sent: 3/16/2023   2:04 PM CDT  To: Stefan Torres MD      ----- Message -----  From: Adonis Lew  Sent: 3/16/2023   2:00 PM CDT  To: Melissa Aviles Staff    Pt is calling to see If something other than what was called in recently be called in    As the meds have a side effect of weight gain      Pls call her back at [phone]    Thank you    Jin

## 2023-03-23 NOTE — PROGRESS NOTES
Interventional Pain Progress Note       Referring Physician: No ref. provider found    PCP: Primary Doctor No    Chief Complaint:   Chief Complaint   Patient presents with    Low-back Pain     Patient received 10% relief from injection, pain is moving to the left side also.  Pain in lower back that radiates down both legs, tingling achy feeling.  Pain scale 6/10       Interval History (3/24/2023): Jeannette Davis presents today for follow-up visit.  she underwent  L4/5 IL DAISY on 2/22/23.  The patient reports that she is/was better following the procedure.  she reports 50 % pain relief initially followed by 10% sustained relief.  The changes lasted 4 weeks so far.  The changes have continued through this visit.  Patient reports pain as 6/10 today.  Patient has completed physical therapy without any improvement.  Two weeks ago switched from Lyrica to Topamax due to weight gain.  She is only been taking Topamax for short period of time is unsure if it is beneficial.  Her back pain today is primarily axial in nature, located in her lower lumbosacral region and bandlike distribution.    Interval History (02/07/2023):  Patient returns to clinic to review images.  Since last being seen patient reports improvement in neck and lower back pain with medications and physical therapy.  Today neck pain described as a aching pain across left-sided neck however she reports that typically the right side of her neck the more problematic side.  She denies any radiation to upper extremities.  Pain is worse with extension, better with flexion.  Primary pain is lower back pain that starts as an aching throbbing pain in the midline lower back and radiates down her right lower extremity along the lateral aspect to her knee.  Pain is worse with lifting and prolonged standing, better with sitting down and rest.  Pain is rated a 4/10, however can increase to an 8/10 with activity. Denies any fevers, chills, changes in gait, weakness,  or bowel and bladder incontinence        SUBJECTIVE:    Jeannette Davis is a 67 y.o. female who presents to the clinic for the evaluation of neck and lower back pain.   Patient reports 4 year history of neck lower back pain.  Neck pain is described as an achy throbbing pain across the right side of her neck with occasional radiation to her right shoulder interscapular area.  She denies any radiation into her right upper extremities.  Pain is worse with extension and lifting, better with flexion and rest.    Lower back pain described as a stabbing burning pain in her lower back, right greater than left.  Pain radiates down her right lower extremity along the lateral posterior aspect to her mid calf.  Pain is worse with extension prolonged standing, better with sitting down and flexion.  Pain is rated a 4/10, but can increase to an 8/10 with activity. Denies any fevers, chills, changes in gait, weakness, or bowel and bladder incontinence      Non-Pharmacologic Treatments:  Physical Therapy/Home Exercise: yes  Ice/Heat:yes  TENS: no  Acupuncture: no  Massage: yes  Chiropractic: no        Previous Pain Medications:  NSAIDs, Tylenol, muscle relaxers, opioids, topicals     report:  Reviewed and consistent with medication use as prescribed.    Pain Procedures:   L4/5 IL DAISY on 2/22/23 with 50 % relief had 10% sustained relief      Imaging:     Results for orders placed during the hospital encounter of 01/12/23    MRI Lumbar Spine Without Contrast    Narrative  EXAMINATION:  MRI LUMBAR SPINE WITHOUT CONTRAST    CLINICAL HISTORY:  Radiculopathy, lumbar regionLow back pain, symptoms persist with > 6wks conservative treatment;Lumbar radiculopathy, symptoms persist with conservative treatment;    TECHNIQUE:  Standard multiplanar noncontrast MRI sequences of the lumbar spine.    COMPARISON:  None    FINDINGS:  The distal cord and conus reveal normal signal and morphology.    The lumbar vertebra reveal normal alignment,  shape and signal intensity.    T12-L1: Unremarkable.    L1-2:     Unremarkable.    L2-3:     Minor disc degeneration with disc desiccation and mild generalized annular disc bulge.  Mild bilateral foraminal stenosis.    L3-4:     Minor disc desiccation and disc bulge.  Mild left foraminal stenosis.    L4-5:     Minor disc desiccation with minor generalized annular disc bulge.    L5-S1:    Unremarkable.    Impression  Minor degenerative disc changes as detailed above.      Electronically signed by: Goyo Barbosa MD  Date:    01/12/2023  Time:    12:38    XR CERVICAL SPINE 2 OR 3 VIEWS 1/10/23     CLINICAL HISTORY:  - Radiculopathy, cervical region.     COMPARISON:  None     FINDINGS:  Mild degenerative disc disease at C5-6 with endplate sclerosis and spurring.  Minimal spurring at C6-7. The vertebral body heights and alignment are within normal limits.     Impression:     Degenerative changes.     Results for orders placed during the hospital encounter of 01/19/19    X-Ray Lumbar Spine Complete 5 View    Narrative  EXAMINATION:  XR LUMBAR SPINE COMPLETE 5 VIEW    CLINICAL HISTORY:  Low back pain, minor trauma;Low back pain    TECHNIQUE:  AP, lateral, spot and bilateral oblique views of the lumbar spine were performed.    COMPARISON:  None    FINDINGS:  Vertebral body heights and alignment are maintained.  No fracture or subluxation.  Disc spaces maintained.  No pars defect.  Soft tissues unremarkable.    IMPRESSION:    Unremarkable lumbar spine      Electronically signed by: Han Guadarrama MD  Date:    01/21/2019  Time:    08:08      Results for orders placed during the hospital encounter of 01/10/23    X-Ray Lumbar Complete Including Flex And Ext    Narrative  EXAMINATION:  XR LUMBAR SPINE 5 VIEW WITH FLEX AND EXT    CLINICAL HISTORY:  - Radiculopathy, lumbar region.    COMPARISON:  01/19/2019    FINDINGS:  Mild degenerative disc disease at L2-3 with endplate sclerosis and spurring.  The disc spaces are  otherwise maintained.  Alignment is satisfactory.  Mild facet DJD at L4-5 and L5-S1.  Aortic atherosclerosis.    Impression  Mild degenerative disc disease at L2-3.      Electronically signed by: Adam Dennis MD  Date:    01/10/2023  Time:    15:37        Past Medical History:   Diagnosis Date    Hypertension     Unspecified glaucoma 02/15/2023     Past Surgical History:   Procedure Laterality Date    EPIDURAL STEROID INJECTION N/A 2/22/2023    Procedure: Lumbar L4/L5 IL DAISY;  Surgeon: Stefan Torres MD;  Location: [facility];  Service: Pain Management;  Laterality: N/A;    HYSTERECTOMY      LEG SURGERY      TONSILLECTOMY       Social History     Socioeconomic History    Marital status:    Tobacco Use    Smoking status: Former     Family History   Problem Relation Age of Onset    Glaucoma Mother     Cataracts Mother     Cataracts Sister     Skin cancer Father        Review of patient's allergies indicates:   Allergen Reactions    Ace inhibitors Other (See Comments)     cough      Codeine Nausea And Vomiting    Lisinopril (bulk) Other (See Comments)    Augmentin [amoxicillin-pot clavulanate] Nausea And Vomiting    Amlodipine Swelling     DR. CARRASCO D/C DUE TO SWELLING OF ANKLES    Avapro [irbesartan] Other (See Comments)       Current Outpatient Medications   Medication Sig    atorvastatin (LIPITOR) 20 MG tablet Take 20 mg by mouth once daily.    clonazePAM (KLONOPIN) 0.5 MG tablet TAKE 1 TABLET BY MOUTH TWICE DAILY AS NEEDED FOR ANXIETY FOR UP TO 30 DAYS    estradiol (ESTRACE) 0.5 MG tablet Take 0.5 mg by mouth once daily.    hydrochlorothiazide (HYDRODIURIL) 25 MG tablet Take 25 mg by mouth once daily.    hydrOXYzine HCl (ATARAX) 25 MG tablet Take 1 tablet (25 mg total) by mouth nightly as needed for Itching. Caution- can cause drowsiness    latanoprost 0.005 % ophthalmic solution Place 1 drop into both eyes once daily.    loratadine (CLARITIN) 10 mg tablet Take 10 mg by mouth once daily.    losartan  (COZAAR) 50 MG tablet Take 50 mg by mouth once daily.    metoprolol succinate (TOPROL-XL) 25 MG 24 hr tablet TAKE 1 TABLET BY MOUTH DAILY    mupirocin (BACTROBAN) 2 % ointment AAA three times a day    nabumetone (RELAFEN) 750 MG tablet Take 1 tablet (750 mg total) by mouth 2 (two) times daily as needed for Pain.    predniSONE (DELTASONE) 20 MG tablet Take 3 pills po qam with breakfast x 1 wk then take 2 po qam with breakfast x 1 wk then take 1 po qam with breakfast x 1 wk    topiramate (TOPAMAX) 25 MG tablet Take 2 tablets (50 mg total) by mouth every evening.    triamcinolone acetonide 0.1% (KENALOG) 0.1 % cream AAA bid to affected areas on arms/legs for up to 4 weeks per course     No current facility-administered medications for this visit.         ROS  Review of Systems   Constitutional:  Negative for chills, diaphoresis, fatigue and fever.   Respiratory:  Negative for chest tightness, shortness of breath, wheezing and stridor.    Cardiovascular:  Negative for chest pain and leg swelling.   Gastrointestinal:  Negative for blood in stool, diarrhea, nausea and vomiting.   Endocrine: Negative for cold intolerance and heat intolerance.   Genitourinary:  Positive for urgency. Negative for dysuria and hematuria.   Musculoskeletal:  Positive for arthralgias, back pain, myalgias, neck pain and neck stiffness. Negative for gait problem and joint swelling.   Skin:  Negative for rash.   Neurological:  Positive for weakness and numbness. Negative for tremors, seizures, speech difficulty, light-headedness and headaches.   Hematological:  Does not bruise/bleed easily.   Psychiatric/Behavioral:  Negative for agitation, confusion and suicidal ideas. The patient is not nervous/anxious.           OBJECTIVE:  /81   Pulse 61   Resp 16   Ht 5' (1.524 m)   Wt 100.7 kg (222 lb 0.1 oz)   BMI 43.36 kg/m²         Physical Exam  Constitutional:       General: She is not in acute distress.     Appearance: Normal appearance. She  is not ill-appearing.   HENT:      Head: Normocephalic and atraumatic.      Nose: No congestion or rhinorrhea.   Eyes:      Extraocular Movements: Extraocular movements intact.      Pupils: Pupils are equal, round, and reactive to light.   Cardiovascular:      Pulses: Normal pulses.   Pulmonary:      Effort: Pulmonary effort is normal.   Abdominal:      General: Abdomen is flat.      Palpations: Abdomen is soft.   Musculoskeletal:      Cervical back: Normal range of motion and neck supple.   Skin:     General: Skin is warm and dry.      Capillary Refill: Capillary refill takes less than 2 seconds.   Neurological:      General: No focal deficit present.      Mental Status: She is alert and oriented to person, place, and time.      Cranial Nerves: No cranial nerve deficit.      Sensory: No sensory deficit.      Motor: Weakness present. No abnormal muscle tone.      Gait: Gait abnormal.      Deep Tendon Reflexes: Babinski sign absent on the right side. Babinski sign absent on the left side.      Reflex Scores:       Tricep reflexes are 2+ on the right side and 2+ on the left side.       Bicep reflexes are 2+ on the right side and 2+ on the left side.       Brachioradialis reflexes are 2+ on the right side and 2+ on the left side.       Patellar reflexes are 2+ on the right side and 2+ on the left side.       Achilles reflexes are 1+ on the right side and 1+ on the left side.     Comments: 4/5 strength in right knee extension and knee flexion.  Otherwise, 5/5 strength in muscle groups of bilateral lower and upper extremities     Psychiatric:         Mood and Affect: Mood normal.         Behavior: Behavior normal.         Thought Content: Thought content normal.         Musculoskeletal:    Cervical Exam  Incision: no  Pain with Flexion: no  Pain with Extension: yes  Paraspinous TTP:  Left-greater-than-right  Facet TTP:  C5-C6  Spurling:  Negative bilaterally  ROM:  Improved compared to prior exam    Lumbar  Exam  Incision: no  Pain with Flexion: no  Pain with Extension: yes  ROM:  Improved compared to prior exam  Paraspinous TTP:  Right greater than left  Facet TTP:  L4-L5  Facet Loading:  Positive bilaterally  SLR:  Positive on the right at 80° in L4 distribution  SIJ TTP:  Negative bilaterally  CHRISTI:  Negative bilateraly      LABS:  Lab Results   Component Value Date    WBC 6.79 12/27/2007    HGB 13.2 12/27/2007    HCT 41.0 12/27/2007    .5 (H) 12/27/2007     12/27/2007       CMP  Sodium   Date Value Ref Range Status   09/11/2007 142 136 - 145 mMol/l Final     Potassium   Date Value Ref Range Status   09/11/2007 3.8 3.3 - 5.3 mMol/l Final     Chloride   Date Value Ref Range Status   09/11/2007 107 95 - 110 mMol/l Final     CO2   Date Value Ref Range Status   09/11/2007 27 23.0 - 29.0 mEq/L Final     Glucose   Date Value Ref Range Status   09/11/2007 88 70 - 110 mg/dl Final     BUN   Date Value Ref Range Status   09/11/2007 15 5 - 23 mg/dl Final     Creatinine   Date Value Ref Range Status   09/11/2007 0.7 0.5 - 1.4 mg/dl Final     Calcium   Date Value Ref Range Status   09/11/2007 9.1 8.7 - 10.5 mg/dl Final     Total Protein   Date Value Ref Range Status   06/27/2007 6.8 6.0 - 8.4 gm/dl Final     Albumin   Date Value Ref Range Status   06/27/2007 4.4 3.5 - 5.2 g/dl Final     Total Bilirubin   Date Value Ref Range Status   06/27/2007 0.3 0.1 - 1.0 mg/dl Final     Comment:     These are the guidelines recommended by the .  Especially  for infants and newborns, interpretation of results should be based  on gestational age, weight and in agreement with clinical  observations.  .  Premature Infant recommended reference range (Bilirubin, Total)  Up to 24 hours.............<8.0 mg/dl  Up to 48 hours............<12.0 mg/dl  3-5 days..................<15.0 mg/dl  6-29 days.................<15.0 mg/dl       Alkaline Phosphatase   Date Value Ref Range Status   06/27/2007 66 45 - 130 U/L Final     AST    Date Value Ref Range Status   12/27/2007 20 0 - 31 U/L Final     ALT   Date Value Ref Range Status   12/27/2007 25 0 - 31 U/L Final       Lab Results   Component Value Date    HGBA1C 5.4 06/20/2006             ASSESSMENT:       67 y.o. year old female with lower back and neck pain, consistent with     1. Lumbar spondylosis  IR Facet Inj Lumbar 2nd Vert Bi    IR Facet Inj Lumbar 1st Vert Bi    Case Request-RAD/Other Procedure Area: Bilateral L3-5 MBB 1st diagnostic block RN IV Sedation          Lumbar spondylosis  -     IR Facet Inj Lumbar 2nd Vert Bi; Future; Expected date: 03/24/2023  -     IR Facet Inj Lumbar 1st Vert Bi; Future; Expected date: 03/24/2023  -     Case Request-RAD/Other Procedure Area: Bilateral L3-5 MBB 1st diagnostic block RN IV Sedation               PLAN:   - Interventions:  Schedule for 1st diagnostic block L3-L5 MBB, Welchol next day to obtain % relief, if 80% or greater relief we will schedule for repeat diagnostic block with goal to move towards RFA.  If less than 80% relief schedule follow-up to discuss additional options  S/p L4-5 interlaminar epidural steroid injection for lumbar radiculopathy on 02/22/2023  - Anticoagulation use:   no no anticoagulation    - Medications:   Continue Topamax 50 mg nightly   Discontinued Lyrica due to weight gain   Continue Relafen 750 mg twice a day as needed    - Therapy:    Continue formal physical therapy for neck and lower back pain    - Imaging/Diagnostic:   X-rays of lumbar and cervical spine reviewed and findings discussed with patient.     MRI of lumbar spine reviewed and findings discussed with patient.  Significant for loss of disc height at L2-3 and L4-5 with mild foraminal stenosis at both levels.    - Consults:   None at this time        - Patient Questions: Answered all of the patient's questions regarding diagnosis, therapy, and treatment    - Follow up visit:  Pending results of diagnostic block        The above plan and management  options were discussed at length with patient. Patient is in agreement with the above and verbalized understanding.    I discussed the goals of interventional chronic pain management with the patient on today's visit.  I explained the utility of injections for diagnostic and therapeutic purposes.  We discussed a multimodal approach to pain including treating the patient's given worst pain at any given time.  We will use a systematic approach to addressing pain.  We will also adopt a multimodal approach that includes injections, adjuvant medications, physical therapy, at times psychiatry.  There may be a limited role for opioid use intermittently in the treatment of pain, more particularly for acute pain although no one approach can be used as a sole treatment modality.    I emphasized the importance of regular exercise, core strengthening and stretching, diet and weight loss as a cornerstone of long-term pain management.      Evelyne Yarbrough PA-C  Interventional Pain Management  [facility]    Disclaimer:  This note was prepared using voice recognition system and is likely to have sound alike errors that may have been overlooked even after proof reading.  Please call me with any questions

## 2023-03-24 ENCOUNTER — OFFICE VISIT (OUTPATIENT)
Dept: PAIN MEDICINE | Facility: CLINIC | Age: 68
End: 2023-03-24
Payer: MEDICARE

## 2023-03-24 VITALS
WEIGHT: 222 LBS | HEIGHT: 60 IN | DIASTOLIC BLOOD PRESSURE: 81 MMHG | HEART RATE: 61 BPM | SYSTOLIC BLOOD PRESSURE: 139 MMHG | BODY MASS INDEX: 43.59 KG/M2 | RESPIRATION RATE: 16 BRPM

## 2023-03-24 DIAGNOSIS — M47.816 LUMBAR SPONDYLOSIS: Primary | ICD-10-CM

## 2023-03-24 PROCEDURE — 99214 OFFICE O/P EST MOD 30 MIN: CPT | Mod: S$PBB,,, | Performed by: PHYSICIAN ASSISTANT

## 2023-03-24 PROCEDURE — 99214 PR OFFICE/OUTPT VISIT, EST, LEVL IV, 30-39 MIN: ICD-10-PCS | Mod: S$PBB,,, | Performed by: PHYSICIAN ASSISTANT

## 2023-03-24 PROCEDURE — 99999 PR PBB SHADOW E&M-EST. PATIENT-LVL IV: CPT | Mod: PBBFAC,,, | Performed by: PHYSICIAN ASSISTANT

## 2023-03-24 PROCEDURE — 99214 OFFICE O/P EST MOD 30 MIN: CPT | Mod: PBBFAC,PN | Performed by: PHYSICIAN ASSISTANT

## 2023-03-24 PROCEDURE — 99999 PR PBB SHADOW E&M-EST. PATIENT-LVL IV: ICD-10-PCS | Mod: PBBFAC,,, | Performed by: PHYSICIAN ASSISTANT

## 2023-03-29 NOTE — PRE-PROCEDURE INSTRUCTIONS
Spoke with patient regarding procedure scheduled on 4.6     Arrival time 1000     Has patient been sick with fever or on antibiotics within the last 7 days? No     Does the patient have any open wounds, sores or rashes? No     Does the patient have any recent fractures? no     Has patient received a vaccination within the last 7 days? No     Received the COVID vaccination? yes     Has the patient stopped all medications as directed? na     Does patient have a pacemaker and or defibrillator? no     Does the patient have a ride to and from procedure and someone reliable to remain with patient?       Is the patient diabetic? no     Does the patient have sleep apnea? Or use O2 at home? no     Is the patient receiving sedation? yes     Is the patient instructed to remain NPO beginning at midnight the night before their procedure? yes     Procedure location confirmed with patient? Yes     Covid- Denies signs/symptoms. Instructed to notify PAT/MD if any changes.

## 2023-04-06 ENCOUNTER — HOSPITAL ENCOUNTER (OUTPATIENT)
Facility: HOSPITAL | Age: 68
Discharge: HOME | End: 2023-04-06
Attending: ANESTHESIOLOGY | Admitting: ANESTHESIOLOGY
Payer: MEDICARE

## 2023-04-06 VITALS
DIASTOLIC BLOOD PRESSURE: 72 MMHG | HEIGHT: 60 IN | HEART RATE: 60 BPM | BODY MASS INDEX: 43.13 KG/M2 | TEMPERATURE: 98 F | WEIGHT: 219.69 LBS | OXYGEN SATURATION: 98 % | SYSTOLIC BLOOD PRESSURE: 150 MMHG | RESPIRATION RATE: 20 BRPM

## 2023-04-06 DIAGNOSIS — M47.816 LUMBAR SPONDYLOSIS: Primary | ICD-10-CM

## 2023-04-06 PROCEDURE — 64494 PR INJ DX/THER AGNT PARAVERT FACET JOINT,IMG GUIDE,LUMBAR/SAC, 2ND LEVEL: ICD-10-PCS | Mod: 50,,, | Performed by: ANESTHESIOLOGY

## 2023-04-06 PROCEDURE — 25000003 PHARM REV CODE 250: Performed by: ANESTHESIOLOGY

## 2023-04-06 PROCEDURE — 63600175 PHARM REV CODE 636 W HCPCS: Performed by: ANESTHESIOLOGY

## 2023-04-06 PROCEDURE — 64493 INJ PARAVERT F JNT L/S 1 LEV: CPT | Mod: 50 | Performed by: ANESTHESIOLOGY

## 2023-04-06 PROCEDURE — 64493 PR INJ DX/THER AGNT PARAVERT FACET JOINT,IMG GUIDE,LUMBAR/SAC,1ST LVL: ICD-10-PCS | Mod: 50,,, | Performed by: ANESTHESIOLOGY

## 2023-04-06 PROCEDURE — 64494 INJ PARAVERT F JNT L/S 2 LEV: CPT | Mod: 50,,, | Performed by: ANESTHESIOLOGY

## 2023-04-06 PROCEDURE — 64494 INJ PARAVERT F JNT L/S 2 LEV: CPT | Mod: 50 | Performed by: ANESTHESIOLOGY

## 2023-04-06 PROCEDURE — 64493 INJ PARAVERT F JNT L/S 1 LEV: CPT | Mod: 50,,, | Performed by: ANESTHESIOLOGY

## 2023-04-06 RX ORDER — MIDAZOLAM HYDROCHLORIDE 1 MG/ML
INJECTION, SOLUTION INTRAMUSCULAR; INTRAVENOUS
Status: DISCONTINUED | OUTPATIENT
Start: 2023-04-06 | End: 2023-04-06 | Stop reason: HOSPADM

## 2023-04-06 RX ORDER — ONDANSETRON 2 MG/ML
4 INJECTION INTRAMUSCULAR; INTRAVENOUS ONCE AS NEEDED
Status: DISCONTINUED | OUTPATIENT
Start: 2023-04-06 | End: 2023-04-06 | Stop reason: HOSPADM

## 2023-04-06 RX ORDER — FENTANYL CITRATE 50 UG/ML
INJECTION, SOLUTION INTRAMUSCULAR; INTRAVENOUS
Status: DISCONTINUED | OUTPATIENT
Start: 2023-04-06 | End: 2023-04-06 | Stop reason: HOSPADM

## 2023-04-06 RX ORDER — BUPIVACAINE HYDROCHLORIDE 5 MG/ML
INJECTION, SOLUTION EPIDURAL; INTRACAUDAL
Status: DISCONTINUED | OUTPATIENT
Start: 2023-04-06 | End: 2023-04-06 | Stop reason: HOSPADM

## 2023-04-06 RX ORDER — METHYLPREDNISOLONE ACETATE 40 MG/ML
INJECTION, SUSPENSION INTRA-ARTICULAR; INTRALESIONAL; INTRAMUSCULAR; SOFT TISSUE
Status: DISCONTINUED | OUTPATIENT
Start: 2023-04-06 | End: 2023-04-06 | Stop reason: HOSPADM

## 2023-04-06 NOTE — DISCHARGE INSTRUCTIONS

## 2023-04-06 NOTE — OP NOTE
LUMBAR Medial Branch Block Under Fluoroscopy, Bilateral L4/L5 and L5/S1    INFORMED CONSENT: The procedure, risks, benefits and options were discussed with patient. There are no contraindications to the procedure. The patient expressed understanding and agreed to proceed. The personnel performing the procedure was discussed.    Date of procedure 04/06/2023    Time-out taken to identify patient and procedure side prior to starting the procedure.                                                                PROCEDURE:  Bilateral medial branch block at the transverse processes at the level of   L4, L5, and the sacral ala    Pre Procedure diagnosis:    Lumbar spondylosis [M47.816]  1. Lumbar spondylosis        Post-Procedure diagnosis:   same    PHYSICIAN: Stefan Torres MD  ASSISTANTS: None    MEDICATIONS INJECTED: 0.5% bupivicane, 1mL at each level    LOCAL ANESTHETIC USED: Lidocaine, 1%, 1ml at each level    ESTIMATED BLOOD LOSS:  None.    COMPLICATIONS:  None.    Sedation: Conscious sedation provided by M.D    SEDATION MEDICATIONS: local/IV sedation: Versed 2 mg and fentanyl 50 mcg IV.  Conscious sedation ordered by MD.  Patient reevaluated and sedation administered by MD and monitored by RN.  Total sedation time was less than 10 min.    Total sedation time was <10 min      TECHNIQUE: Laying in a prone position, the patient was prepped and draped in the usual sterile fashion using ChloraPrep and fenestrated drape.  The level was determined under fluoroscopic guidance.  Local anesthetic was given by going down to the hub of the 27-gauge 1.25in needle and raising a wheel.  A 25-gauge 3.5inch needle was introduced to the anatomic local of the medial branch at each of the above levels using fluoroscopy in the AP, oblique, and lateral views.  After negative aspiration, medication was injected slowly. The patient tolerated the procedure well.       The patient was monitored after the procedure.  Patient was given post  procedure and discharge instructions to follow at home.  We will see the patient back in two weeks or the patient may call to inform of status. The patient was discharged in a stable condition

## 2023-04-06 NOTE — H&P
HPI  Patient presenting for Procedure(s) (LRB):  Bilateral L3-5 MBB 1st diagnostic block RN IV Sedation (Bilateral)     Patient on Anti-coagulation No    No health changes since previous encounter    Past Medical History:   Diagnosis Date    Hypertension     Unspecified glaucoma 02/15/2023     Past Surgical History:   Procedure Laterality Date    EPIDURAL STEROID INJECTION N/A 2/22/2023    Procedure: Lumbar L4/L5 IL DAISY;  Surgeon: Stefan Torres MD;  [facility];  Service: Pain Management;  Laterality: N/A;    HYSTERECTOMY      LEG SURGERY      TONSILLECTOMY       Review of patient's allergies indicates:   Allergen Reactions    Ace inhibitors Other (See Comments)     cough      Codeine Nausea And Vomiting    Lisinopril (bulk) Other (See Comments)    Augmentin [amoxicillin-pot clavulanate] Nausea And Vomiting    Amlodipine Swelling     DR. CARRASCO D/C DUE TO SWELLING OF ANKLES    Avapro [irbesartan] Other (See Comments)        No current facility-administered medications on file prior to encounter.     Current Outpatient Medications on File Prior to Encounter   Medication Sig Dispense Refill    losartan (COZAAR) 50 MG tablet Take 50 mg by mouth once daily.      metoprolol succinate (TOPROL-XL) 25 MG 24 hr tablet TAKE 1 TABLET BY MOUTH DAILY      atorvastatin (LIPITOR) 20 MG tablet Take 20 mg by mouth once daily.      clonazePAM (KLONOPIN) 0.5 MG tablet TAKE 1 TABLET BY MOUTH TWICE DAILY AS NEEDED FOR ANXIETY FOR UP TO 30 DAYS      estradiol (ESTRACE) 0.5 MG tablet Take 0.5 mg by mouth once daily.      hydrochlorothiazide (HYDRODIURIL) 25 MG tablet Take 25 mg by mouth once daily.      hydrOXYzine HCl (ATARAX) 25 MG tablet Take 1 tablet (25 mg total) by mouth nightly as needed for Itching. Caution- can cause drowsiness 30 tablet 0    latanoprost 0.005 % ophthalmic solution Place 1 drop into both eyes once daily.      loratadine (CLARITIN) 10 mg tablet Take 10 mg by mouth once daily.      mupirocin  (BACTROBAN) 2 % ointment AAA three times a day 30 g 0    nabumetone (RELAFEN) 750 MG tablet Take 1 tablet (750 mg total) by mouth 2 (two) times daily as needed for Pain. 60 tablet 1    predniSONE (DELTASONE) 20 MG tablet Take 3 pills po qam with breakfast x 1 wk then take 2 po qam with breakfast x 1 wk then take 1 po qam with breakfast x 1 wk 42 tablet 0    topiramate (TOPAMAX) 25 MG tablet Take 2 tablets (50 mg total) by mouth every evening. 60 tablet 1    triamcinolone acetonide 0.1% (KENALOG) 0.1 % cream AAA bid to affected areas on arms/legs for up to 4 weeks per course 454 g 3        PMHx, PSHx, Allergies, Medications reviewed in epic    ROS negative except pain complaints in HPI    OBJECTIVE:    BP (!) 184/81   Pulse (!) 55   Temp 97.4 °F (36.3 °C)   Resp 15   Ht 5' (1.524 m)   Wt 99.6 kg (219 lb 11 oz)   SpO2 99%   Breastfeeding No   BMI 42.90 kg/m²     PHYSICAL EXAMINATION:    GENERAL: Well appearing, in no acute distress, alert and oriented x3.  PSYCH:  Mood and affect appropriate.  SKIN: Skin color, texture, turgor normal, no rashes or lesions which will impact the procedure.  CV: RRR with palpation of the radial artery.  PULM: No evidence of respiratory difficulty, symmetric chest rise. Clear to auscultation.  NEURO: Cranial nerves grossly intact.    Plan:    Proceed with procedure as planned Procedure(s) (LRB):  Bilateral L3-5 MBB 1st diagnostic block RN IV Sedation (Bilateral)    Stefan Torres MD  04/06/2023

## 2023-04-06 NOTE — DISCHARGE SUMMARY
Discharge Note  Short Stay      SUMMARY     Admit Date: 4/6/2023    Attending Physician: Stefan Torres MD        Discharge Physician: Stefan Torres MD        Discharge Date: 4/6/2023 11:39 AM    Procedure(s) (LRB):  Bilateral L3-5 MBB 1st diagnostic block RN IV Sedation (Bilateral)    Final Diagnosis: Lumbar spondylosis [M47.816]    Disposition: Home or self care    Patient Instructions:   Current Discharge Medication List        CONTINUE these medications which have NOT CHANGED    Details   losartan (COZAAR) 50 MG tablet Take 50 mg by mouth once daily.      metoprolol succinate (TOPROL-XL) 25 MG 24 hr tablet TAKE 1 TABLET BY MOUTH DAILY      atorvastatin (LIPITOR) 20 MG tablet Take 20 mg by mouth once daily.      clonazePAM (KLONOPIN) 0.5 MG tablet TAKE 1 TABLET BY MOUTH TWICE DAILY AS NEEDED FOR ANXIETY FOR UP TO 30 DAYS      estradiol (ESTRACE) 0.5 MG tablet Take 0.5 mg by mouth once daily.      hydrochlorothiazide (HYDRODIURIL) 25 MG tablet Take 25 mg by mouth once daily.      hydrOXYzine HCl (ATARAX) 25 MG tablet Take 1 tablet (25 mg total) by mouth nightly as needed for Itching. Caution- can cause drowsiness  Qty: 30 tablet, Refills: 0    Associated Diagnoses: Pruritus      latanoprost 0.005 % ophthalmic solution Place 1 drop into both eyes once daily.      loratadine (CLARITIN) 10 mg tablet Take 10 mg by mouth once daily.      mupirocin (BACTROBAN) 2 % ointment AAA three times a day  Qty: 30 g, Refills: 0    Associated Diagnoses: Disease of skin and subcutaneous tissue      nabumetone (RELAFEN) 750 MG tablet Take 1 tablet (750 mg total) by mouth 2 (two) times daily as needed for Pain.  Qty: 60 tablet, Refills: 1    Associated Diagnoses: DDD (degenerative disc disease), cervical; DDD (degenerative disc disease), lumbar; Cervical radiculopathy; Lumbar spondylosis; Lumbar radiculopathy; Cervical spondylosis; Dorsalgia, unspecified; Lumbar radiculopathy, chronic      predniSONE (DELTASONE) 20 MG tablet  Take 3 pills po qam with breakfast x 1 wk then take 2 po qam with breakfast x 1 wk then take 1 po qam with breakfast x 1 wk  Qty: 42 tablet, Refills: 0    Associated Diagnoses: Pruritus; Disease of skin and subcutaneous tissue      topiramate (TOPAMAX) 25 MG tablet Take 2 tablets (50 mg total) by mouth every evening.  Qty: 60 tablet, Refills: 1    Comments: Take 1 pill at night for 5 days.  Then take 2 pills at night.  Associated Diagnoses: Lumbar radiculopathy      triamcinolone acetonide 0.1% (KENALOG) 0.1 % cream AAA bid to affected areas on arms/legs for up to 4 weeks per course  Qty: 454 g, Refills: 3    Associated Diagnoses: Pruritus; Disease of skin and subcutaneous tissue                 Discharge Diagnosis: Lumbar spondylosis [M47.816]  Condition on Discharge: Stable with no complications to procedure   Diet on Discharge: Same as before.  Activity: as per instruction sheet.  Discharge to: Home with a responsible adult.  Follow up: 2-4 weeks       Please call the office at [phone] if you experience any weakness or loss of sensation, fever > 101.5, pain uncontrolled with oral medications, persistent nausea/vomiting/or diarrhea, redness or drainage from the incisions, or any other worrisome concerns. If physician on call was not reached or could not communicate with our office for any reason please go to the nearest emergency department

## 2023-04-17 ENCOUNTER — TELEPHONE (OUTPATIENT)
Dept: PAIN MEDICINE | Facility: CLINIC | Age: 68
End: 2023-04-17
Payer: MEDICARE

## 2023-04-17 NOTE — TELEPHONE ENCOUNTER
Returned pt call. Pt was calling to schedule a second block. Asked pt about how much relief would she say she received in the first 24 hrs after the first block. Pt states about 40%. Informed pt that I will send this information over to Dr. Torres. Scheduled appointment for 04/18, she will come in with her  at 8am. All questions answered.

## 2023-04-18 ENCOUNTER — OFFICE VISIT (OUTPATIENT)
Dept: PAIN MEDICINE | Facility: CLINIC | Age: 68
End: 2023-04-18
Payer: MEDICARE

## 2023-04-18 VITALS
DIASTOLIC BLOOD PRESSURE: 65 MMHG | HEART RATE: 60 BPM | BODY MASS INDEX: 42.73 KG/M2 | SYSTOLIC BLOOD PRESSURE: 164 MMHG | WEIGHT: 217.63 LBS | RESPIRATION RATE: 16 BRPM | HEIGHT: 60 IN

## 2023-04-18 DIAGNOSIS — M54.16 LUMBAR RADICULOPATHY: ICD-10-CM

## 2023-04-18 DIAGNOSIS — M47.816 LUMBAR SPONDYLOSIS: Primary | ICD-10-CM

## 2023-04-18 DIAGNOSIS — M51.36 DDD (DEGENERATIVE DISC DISEASE), LUMBAR: ICD-10-CM

## 2023-04-18 PROCEDURE — 99214 PR OFFICE/OUTPT VISIT, EST, LEVL IV, 30-39 MIN: ICD-10-PCS | Mod: S$PBB,,, | Performed by: ANESTHESIOLOGY

## 2023-04-18 PROCEDURE — 99999 PR PBB SHADOW E&M-EST. PATIENT-LVL IV: CPT | Mod: PBBFAC,,, | Performed by: ANESTHESIOLOGY

## 2023-04-18 PROCEDURE — 99999 PR PBB SHADOW E&M-EST. PATIENT-LVL IV: ICD-10-PCS | Mod: PBBFAC,,, | Performed by: ANESTHESIOLOGY

## 2023-04-18 PROCEDURE — 99214 OFFICE O/P EST MOD 30 MIN: CPT | Mod: PBBFAC,PN | Performed by: ANESTHESIOLOGY

## 2023-04-18 PROCEDURE — 99214 OFFICE O/P EST MOD 30 MIN: CPT | Mod: S$PBB,,, | Performed by: ANESTHESIOLOGY

## 2023-04-18 NOTE — PROGRESS NOTES
Interventional Pain Progress Note       Referring Physician: No ref. provider found    PCP: Primary Doctor No    Chief Complaint:   Lower Back Pain    Interval History (04/18/2023):  Patient returns to clinic after procedure.  Patient reports initially receiving 80% relief in her lower back pain after lumbar medial branch block on 04/06/2023 for approximately 8 hours.  This then dropped to 40% relief the next day.  Pain is now currently back to her baseline.  Pain is described as an aching burning pain across her lower back, right-greater-than-left.  She reports occasional radiation to her right buttocks and hip area.  Pain is worse with lifting and extension, better with sitting down and rest.  Pain is rated an 8/10. Denies any fevers, chills, saddle anesthesia, or bowel and bladder incontinence        Interval History (3/24/2023): Jeannette Davis presents today for follow-up visit.  she underwent  L4/5 IL DAISY on 2/22/23.  The patient reports that she is/was better following the procedure.  she reports 50 % pain relief initially followed by 10% sustained relief.  The changes lasted 4 weeks so far.  The changes have continued through this visit.  Patient reports pain as 6/10 today.  Patient has completed physical therapy without any improvement.  Two weeks ago switched from Lyrica to Topamax due to weight gain.  She is only been taking Topamax for short period of time is unsure if it is beneficial.  Her back pain today is primarily axial in nature, located in her lower lumbosacral region and bandlike distribution.    Interval History (02/07/2023):  Patient returns to clinic to review images.  Since last being seen patient reports improvement in neck and lower back pain with medications and physical therapy.  Today neck pain described as a aching pain across left-sided neck however she reports that typically the right side of her neck the more problematic side.  She denies any radiation to upper extremities.   Pain is worse with extension, better with flexion.  Primary pain is lower back pain that starts as an aching throbbing pain in the midline lower back and radiates down her right lower extremity along the lateral aspect to her knee.  Pain is worse with lifting and prolonged standing, better with sitting down and rest.  Pain is rated a 4/10, however can increase to an 8/10 with activity. Denies any fevers, chills, changes in gait, weakness, or bowel and bladder incontinence        SUBJECTIVE:    Jeannette Davis is a 67 y.o. female who presents to the clinic for the evaluation of neck and lower back pain.   Patient reports 4 year history of neck lower back pain.  Neck pain is described as an achy throbbing pain across the right side of her neck with occasional radiation to her right shoulder interscapular area.  She denies any radiation into her right upper extremities.  Pain is worse with extension and lifting, better with flexion and rest.    Lower back pain described as a stabbing burning pain in her lower back, right greater than left.  Pain radiates down her right lower extremity along the lateral posterior aspect to her mid calf.  Pain is worse with extension prolonged standing, better with sitting down and flexion.  Pain is rated a 4/10, but can increase to an 8/10 with activity. Denies any fevers, chills, changes in gait, weakness, or bowel and bladder incontinence      Non-Pharmacologic Treatments:  Physical Therapy/Home Exercise: yes  Ice/Heat:yes  TENS: no  Acupuncture: no  Massage: yes  Chiropractic: no        Previous Pain Medications:  NSAIDs, Tylenol, muscle relaxers, opioids, topicals     report:  Reviewed and consistent with medication use as prescribed.    Pain Procedures:   -04/06/2023:  Bilateral L4/L5 and L5/S1 medial branch block, 80% relief for 8 hours  L4/5 IL DAISY on 2/22/23 with 50 % relief had 10% sustained relief      Imaging:     Results for orders placed during the hospital  encounter of 01/12/23    MRI Lumbar Spine Without Contrast    Narrative  EXAMINATION:  MRI LUMBAR SPINE WITHOUT CONTRAST    CLINICAL HISTORY:  Radiculopathy, lumbar regionLow back pain, symptoms persist with > 6wks conservative treatment;Lumbar radiculopathy, symptoms persist with conservative treatment;    TECHNIQUE:  Standard multiplanar noncontrast MRI sequences of the lumbar spine.    COMPARISON:  None    FINDINGS:  The distal cord and conus reveal normal signal and morphology.    The lumbar vertebra reveal normal alignment, shape and signal intensity.    T12-L1: Unremarkable.    L1-2:     Unremarkable.    L2-3:     Minor disc degeneration with disc desiccation and mild generalized annular disc bulge.  Mild bilateral foraminal stenosis.    L3-4:     Minor disc desiccation and disc bulge.  Mild left foraminal stenosis.    L4-5:     Minor disc desiccation with minor generalized annular disc bulge.    L5-S1:    Unremarkable.    Impression  Minor degenerative disc changes as detailed above.      Electronically signed by: Goyo Barbosa MD  Date:    01/12/2023  Time:    12:38    XR CERVICAL SPINE 2 OR 3 VIEWS 1/10/23     CLINICAL HISTORY:  - Radiculopathy, cervical region.     COMPARISON:  None     FINDINGS:  Mild degenerative disc disease at C5-6 with endplate sclerosis and spurring.  Minimal spurring at C6-7. The vertebral body heights and alignment are within normal limits.     Impression:     Degenerative changes.     Results for orders placed during the hospital encounter of 01/19/19    X-Ray Lumbar Spine Complete 5 View    Narrative  EXAMINATION:  XR LUMBAR SPINE COMPLETE 5 VIEW    CLINICAL HISTORY:  Low back pain, minor trauma;Low back pain    TECHNIQUE:  AP, lateral, spot and bilateral oblique views of the lumbar spine were performed.    COMPARISON:  None    FINDINGS:  Vertebral body heights and alignment are maintained.  No fracture or subluxation.  Disc spaces maintained.  No pars defect.  Soft tissues  unremarkable.    IMPRESSION:    Unremarkable lumbar spine      Electronically signed by: Han Guadarrama MD  Date:    01/21/2019  Time:    08:08      Results for orders placed during the hospital encounter of 01/10/23    X-Ray Lumbar Complete Including Flex And Ext    Narrative  EXAMINATION:  XR LUMBAR SPINE 5 VIEW WITH FLEX AND EXT    CLINICAL HISTORY:  - Radiculopathy, lumbar region.    COMPARISON:  01/19/2019    FINDINGS:  Mild degenerative disc disease at L2-3 with endplate sclerosis and spurring.  The disc spaces are otherwise maintained.  Alignment is satisfactory.  Mild facet DJD at L4-5 and L5-S1.  Aortic atherosclerosis.    Impression  Mild degenerative disc disease at L2-3.      Electronically signed by: Adam Dennis MD  Date:    01/10/2023  Time:    15:37        Past Medical History:   Diagnosis Date    Hypertension     Unspecified glaucoma 02/15/2023     Past Surgical History:   Procedure Laterality Date    EPIDURAL STEROID INJECTION N/A 2/22/2023    Procedure: Lumbar L4/L5 IL DAISY;  Surgeon: Stefan Torres MD;  Location: [facility];  Service: Pain Management;  Laterality: N/A;    HYSTERECTOMY      INJECTION OF ANESTHETIC AGENT AROUND MEDIAL BRANCH NERVES INNERVATING LUMBAR FACET JOINT Bilateral 4/6/2023    Procedure: Bilateral L3-5 MBB 1st diagnostic block RN IV Sedation;  Surgeon: Stefan Torres MD;  Location: [facility];  Service: Pain Management;  Laterality: Bilateral;    LEG SURGERY      TONSILLECTOMY       Social History     Socioeconomic History    Marital status:    Tobacco Use    Smoking status: Former     Family History   Problem Relation Age of Onset    Glaucoma Mother     Cataracts Mother     Cataracts Sister     Skin cancer Father        Review of patient's allergies indicates:   Allergen Reactions    Ace inhibitors Other (See Comments)     cough      Codeine Nausea And Vomiting    Lisinopril (bulk) Other (See Comments)    Augmentin [amoxicillin-pot clavulanate] Nausea And  Vomiting    Amlodipine Swelling     DR. CARRASCO D/C DUE TO SWELLING OF ANKLES    Avapro [irbesartan] Other (See Comments)       Current Outpatient Medications   Medication Sig    atorvastatin (LIPITOR) 20 MG tablet Take 20 mg by mouth once daily.    clonazePAM (KLONOPIN) 0.5 MG tablet TAKE 1 TABLET BY MOUTH TWICE DAILY AS NEEDED FOR ANXIETY FOR UP TO 30 DAYS    estradiol (ESTRACE) 0.5 MG tablet Take 0.5 mg by mouth once daily.    hydrochlorothiazide (HYDRODIURIL) 25 MG tablet Take 25 mg by mouth once daily.    hydrOXYzine HCl (ATARAX) 25 MG tablet Take 1 tablet (25 mg total) by mouth nightly as needed for Itching. Caution- can cause drowsiness    latanoprost 0.005 % ophthalmic solution Place 1 drop into both eyes once daily.    loratadine (CLARITIN) 10 mg tablet Take 10 mg by mouth once daily.    losartan (COZAAR) 50 MG tablet Take 50 mg by mouth once daily.    metoprolol succinate (TOPROL-XL) 25 MG 24 hr tablet TAKE 1 TABLET BY MOUTH DAILY    mupirocin (BACTROBAN) 2 % ointment AAA three times a day    nabumetone (RELAFEN) 750 MG tablet Take 1 tablet (750 mg total) by mouth 2 (two) times daily as needed for Pain.    predniSONE (DELTASONE) 20 MG tablet Take 3 pills po qam with breakfast x 1 wk then take 2 po qam with breakfast x 1 wk then take 1 po qam with breakfast x 1 wk    topiramate (TOPAMAX) 25 MG tablet Take 2 tablets (50 mg total) by mouth every evening.    triamcinolone acetonide 0.1% (KENALOG) 0.1 % cream AAA bid to affected areas on arms/legs for up to 4 weeks per course     No current facility-administered medications for this visit.         ROS  Review of Systems   Constitutional:  Negative for chills, diaphoresis, fatigue and fever.   Respiratory:  Negative for chest tightness, shortness of breath, wheezing and stridor.    Cardiovascular:  Negative for chest pain and leg swelling.   Gastrointestinal:  Negative for blood in stool, diarrhea, nausea and vomiting.   Endocrine: Negative for cold intolerance and  heat intolerance.   Genitourinary:  Positive for urgency. Negative for dysuria and hematuria.   Musculoskeletal:  Positive for arthralgias, back pain, myalgias, neck pain and neck stiffness. Negative for gait problem and joint swelling.   Skin:  Negative for rash.   Neurological:  Positive for weakness. Negative for tremors, seizures, speech difficulty, light-headedness and headaches.   Hematological:  Does not bruise/bleed easily.   Psychiatric/Behavioral:  Negative for agitation, confusion and suicidal ideas. The patient is not nervous/anxious.           OBJECTIVE:  BP (!) 164/65   Pulse 60   Resp 16   Ht 5' (1.524 m)   Wt 98.7 kg (217 lb 9.5 oz)   BMI 42.50 kg/m²         Physical Exam  Constitutional:       General: She is not in acute distress.     Appearance: Normal appearance. She is not ill-appearing.   HENT:      Head: Normocephalic and atraumatic.      Nose: No congestion or rhinorrhea.   Eyes:      Extraocular Movements: Extraocular movements intact.      Pupils: Pupils are equal, round, and reactive to light.   Cardiovascular:      Pulses: Normal pulses.   Pulmonary:      Effort: Pulmonary effort is normal.   Abdominal:      General: Abdomen is flat.      Palpations: Abdomen is soft.   Musculoskeletal:      Cervical back: Normal range of motion and neck supple.   Skin:     General: Skin is warm and dry.      Capillary Refill: Capillary refill takes less than 2 seconds.   Neurological:      General: No focal deficit present.      Mental Status: She is alert and oriented to person, place, and time.      Cranial Nerves: No cranial nerve deficit.      Sensory: No sensory deficit.      Motor: Weakness present. No abnormal muscle tone.      Gait: Gait abnormal.      Deep Tendon Reflexes: Babinski sign absent on the right side. Babinski sign absent on the left side.      Reflex Scores:       Tricep reflexes are 2+ on the right side and 2+ on the left side.       Bicep reflexes are 2+ on the right side and  2+ on the left side.       Brachioradialis reflexes are 2+ on the right side and 2+ on the left side.       Patellar reflexes are 2+ on the right side and 2+ on the left side.       Achilles reflexes are 1+ on the right side and 1+ on the left side.     Comments: 4/5 strength in right knee extension and knee flexion.  Otherwise, 5/5 strength in muscle groups of bilateral lower and upper extremities     Psychiatric:         Mood and Affect: Mood normal.         Behavior: Behavior normal.         Thought Content: Thought content normal.         Musculoskeletal:      Lumbar Exam  Incision: no  Pain with Flexion: no  Pain with Extension: yes  ROM:  Decreased  Paraspinous TTP:  Right greater than left  Facet TTP:  L4-L5  Facet Loading:  Positive bilaterally  SLR:  negative bilaterally   SIJ TTP:  Negative bilaterally  CHRISTI:  Negative bilateraly      LABS:  Lab Results   Component Value Date    WBC 6.79 12/27/2007    HGB 13.2 12/27/2007    HCT 41.0 12/27/2007    .5 (H) 12/27/2007     12/27/2007       CMP  Sodium   Date Value Ref Range Status   09/11/2007 142 136 - 145 mMol/l Final     Potassium   Date Value Ref Range Status   09/11/2007 3.8 3.3 - 5.3 mMol/l Final     Chloride   Date Value Ref Range Status   09/11/2007 107 95 - 110 mMol/l Final     CO2   Date Value Ref Range Status   09/11/2007 27 23.0 - 29.0 mEq/L Final     Glucose   Date Value Ref Range Status   09/11/2007 88 70 - 110 mg/dl Final     BUN   Date Value Ref Range Status   09/11/2007 15 5 - 23 mg/dl Final     Creatinine   Date Value Ref Range Status   09/11/2007 0.7 0.5 - 1.4 mg/dl Final     Calcium   Date Value Ref Range Status   09/11/2007 9.1 8.7 - 10.5 mg/dl Final     Total Protein   Date Value Ref Range Status   06/27/2007 6.8 6.0 - 8.4 gm/dl Final     Albumin   Date Value Ref Range Status   06/27/2007 4.4 3.5 - 5.2 g/dl Final     Total Bilirubin   Date Value Ref Range Status   06/27/2007 0.3 0.1 - 1.0 mg/dl Final     Comment:     These  are the guidelines recommended by the .  Especially  for infants and newborns, interpretation of results should be based  on gestational age, weight and in agreement with clinical  observations.  .  Premature Infant recommended reference range (Bilirubin, Total)  Up to 24 hours.............<8.0 mg/dl  Up to 48 hours............<12.0 mg/dl  3-5 days..................<15.0 mg/dl  6-29 days.................<15.0 mg/dl       Alkaline Phosphatase   Date Value Ref Range Status   06/27/2007 66 45 - 130 U/L Final     AST   Date Value Ref Range Status   12/27/2007 20 0 - 31 U/L Final     ALT   Date Value Ref Range Status   12/27/2007 25 0 - 31 U/L Final       Lab Results   Component Value Date    HGBA1C 6.3 (H) 04/10/2023             ASSESSMENT:       67 y.o. year old female with lower back and neck pain, consistent with     1. Lumbar spondylosis  IR Facet Inj Lumbar 2nd Vert Bi    IR Facet Inj Lumbar 1st Vert Bi    Case Request-RAD/Other Procedure Area: Bilateral L3-5 MBB      2. Lumbar radiculopathy        3. DDD (degenerative disc disease), lumbar            Lumbar spondylosis  -     IR Facet Inj Lumbar 2nd Vert Bi; Future; Expected date: 04/18/2023  -     IR Facet Inj Lumbar 1st Vert Bi; Future; Expected date: 04/18/2023  -     Case Request-RAD/Other Procedure Area: Bilateral L3-5 MBB    Lumbar radiculopathy    DDD (degenerative disc disease), lumbar               PLAN:    Schedule patient for repeat bilateral L4-5 L5-S1 medial branch block.  If patient gets similar relief with 2nd medial branch block, we will proceed with RFA.  -04/06/2023:  Bilateral L4/L5 and L5/S1 medial branch block, 80% relief for 8 hours  S/p L4-5 interlaminar epidural steroid injection for lumbar radiculopathy on 02/22/2023  - Anticoagulation use:   no no anticoagulation    - Medications:   Continue Topamax 50 mg nightly   Continue Relafen 750 mg twice a day as needed    - Therapy:    Continue formal physical therapy for neck and  lower back pain    - Imaging/Diagnostic:   X-rays of lumbar and cervical spine reviewed and findings discussed with patient.     MRI of lumbar spine reviewed and findings discussed with patient.  Significant for loss of disc height at L2-3 and L4-5 with mild foraminal stenosis at both levels.    - Consults:   None at this time        - Patient Questions: Answered all of the patient's questions regarding diagnosis, therapy, and treatment    - Follow up visit:  Follow up after procedure        The above plan and management options were discussed at length with patient. Patient is in agreement with the above and verbalized understanding.    I discussed the goals of interventional chronic pain management with the patient on today's visit.  I explained the utility of injections for diagnostic and therapeutic purposes.  We discussed a multimodal approach to pain including treating the patient's given worst pain at any given time.  We will use a systematic approach to addressing pain.  We will also adopt a multimodal approach that includes injections, adjuvant medications, physical therapy, at times psychiatry.  There may be a limited role for opioid use intermittently in the treatment of pain, more particularly for acute pain although no one approach can be used as a sole treatment modality.    I emphasized the importance of regular exercise, core strengthening and stretching, diet and weight loss as a cornerstone of long-term pain management.      Stefan Torres MD  Interventional Pain Management  [facility]    Disclaimer:  This note was prepared using voice recognition system and is likely to have sound alike errors that may have been overlooked even after proof reading.  Please call me with any questions

## 2023-04-25 NOTE — PRE-PROCEDURE INSTRUCTIONS
Spoke with patient regarding procedure scheduled on 5.8     Arrival time 0700     Has patient been sick with fever or on antibiotics within the last 7 days? No     Does the patient have any open wounds, sores or rashes? No     Does the patient have any recent fractures? no     Has patient received a vaccination within the last 7 days? No     Received the COVID vaccination? yes     Has the patient stopped all medications as directed? na     Does patient have a pacemaker and or defibrillator? no     Does the patient have a ride to and from procedure and someone reliable to remain with patient?       Is the patient diabetic? no     Does the patient have sleep apnea? Or use O2 at home? no     Is the patient receiving sedation? Yes     Is the patient instructed to remain NPO beginning at midnight the night before their procedure? yes     Procedure location confirmed with patient? Yes     Covid- Denies signs/symptoms. Instructed to notify PAT/MD if any changes.

## 2023-05-08 ENCOUNTER — HOSPITAL ENCOUNTER (OUTPATIENT)
Facility: HOSPITAL | Age: 68
Discharge: HOME | End: 2023-05-08
Attending: ANESTHESIOLOGY | Admitting: ANESTHESIOLOGY
Payer: MEDICARE

## 2023-05-08 VITALS
TEMPERATURE: 98 F | WEIGHT: 218.25 LBS | OXYGEN SATURATION: 98 % | HEART RATE: 56 BPM | BODY MASS INDEX: 42.85 KG/M2 | HEIGHT: 60 IN | DIASTOLIC BLOOD PRESSURE: 74 MMHG | RESPIRATION RATE: 16 BRPM | SYSTOLIC BLOOD PRESSURE: 124 MMHG

## 2023-05-08 DIAGNOSIS — M47.816 LUMBAR SPONDYLOSIS: ICD-10-CM

## 2023-05-08 PROCEDURE — 25000003 PHARM REV CODE 250: Performed by: ANESTHESIOLOGY

## 2023-05-08 PROCEDURE — 64493 PR INJ DX/THER AGNT PARAVERT FACET JOINT,IMG GUIDE,LUMBAR/SAC,1ST LVL: ICD-10-PCS | Mod: 50,,, | Performed by: ANESTHESIOLOGY

## 2023-05-08 PROCEDURE — 63600175 PHARM REV CODE 636 W HCPCS: Performed by: ANESTHESIOLOGY

## 2023-05-08 PROCEDURE — 64494 PR INJ DX/THER AGNT PARAVERT FACET JOINT,IMG GUIDE,LUMBAR/SAC, 2ND LEVEL: ICD-10-PCS | Mod: 50,,, | Performed by: ANESTHESIOLOGY

## 2023-05-08 PROCEDURE — 64493 INJ PARAVERT F JNT L/S 1 LEV: CPT | Mod: 50 | Performed by: ANESTHESIOLOGY

## 2023-05-08 PROCEDURE — 64493 INJ PARAVERT F JNT L/S 1 LEV: CPT | Mod: 50,,, | Performed by: ANESTHESIOLOGY

## 2023-05-08 PROCEDURE — 64494 INJ PARAVERT F JNT L/S 2 LEV: CPT | Mod: 50 | Performed by: ANESTHESIOLOGY

## 2023-05-08 PROCEDURE — 64494 INJ PARAVERT F JNT L/S 2 LEV: CPT | Mod: 50,,, | Performed by: ANESTHESIOLOGY

## 2023-05-08 RX ORDER — BUPIVACAINE HYDROCHLORIDE 5 MG/ML
INJECTION, SOLUTION EPIDURAL; INTRACAUDAL
Status: DISCONTINUED | OUTPATIENT
Start: 2023-05-08 | End: 2023-05-08 | Stop reason: HOSPADM

## 2023-05-08 RX ORDER — MIDAZOLAM HYDROCHLORIDE 1 MG/ML
INJECTION, SOLUTION INTRAMUSCULAR; INTRAVENOUS
Status: DISCONTINUED | OUTPATIENT
Start: 2023-05-08 | End: 2023-05-08 | Stop reason: HOSPADM

## 2023-05-08 RX ORDER — FENTANYL CITRATE 50 UG/ML
INJECTION, SOLUTION INTRAMUSCULAR; INTRAVENOUS
Status: DISCONTINUED | OUTPATIENT
Start: 2023-05-08 | End: 2023-05-08 | Stop reason: HOSPADM

## 2023-05-08 RX ORDER — ONDANSETRON 2 MG/ML
4 INJECTION INTRAMUSCULAR; INTRAVENOUS ONCE AS NEEDED
Status: DISCONTINUED | OUTPATIENT
Start: 2023-05-08 | End: 2023-05-08 | Stop reason: HOSPADM

## 2023-05-08 RX ORDER — FUROSEMIDE 20 MG/1
20 TABLET ORAL DAILY
COMMUNITY

## 2023-05-08 NOTE — OP NOTE
LUMBAR Medial Branch Block Under Fluoroscopy, Bilateral L4/L5 and L5/S1    INFORMED CONSENT: The procedure, risks, benefits and options were discussed with patient. There are no contraindications to the procedure. The patient expressed understanding and agreed to proceed. The personnel performing the procedure was discussed.    Date of procedure 05/08/2023    Time-out taken to identify patient and procedure side prior to starting the procedure.                                                                PROCEDURE:  Bilateral medial branch block at the transverse processes at the level of L4, L5, and the sacral ala    Pre Procedure diagnosis:    Lumbar spondylosis [M47.816]  1. Lumbar spondylosis        Post-Procedure diagnosis:   same    PHYSICIAN: Stefan Torres MD  ASSISTANTS: None    MEDICATIONS INJECTED: 0.5% bupivicane, 1mL at each level    LOCAL ANESTHETIC USED: Lidocaine, 1%, 1ml at each level    ESTIMATED BLOOD LOSS:  None.    COMPLICATIONS:  None.    Sedation: Conscious sedation provided by M.D    SEDATION MEDICATIONS: local/IV sedation: Versed 2 mg and fentanyl 50 mcg IV.  Conscious sedation ordered by MD.  Patient reevaluated and sedation administered by MD and monitored by RN.  Total sedation time was less than 15 min.    Total sedation time was >10 but <20 min      TECHNIQUE: Laying in a prone position, the patient was prepped and draped in the usual sterile fashion using ChloraPrep and fenestrated drape.  The level was determined under fluoroscopic guidance.  Local anesthetic was given by going down to the hub of the 27-gauge 1.25in needle and raising a wheel.  A 25-gauge 3.5inch needle was introduced to the anatomic local of the medial branch at each of the above levels using fluoroscopy in the AP, oblique, and lateral views.  After negative aspiration, medication was injected slowly. The patient tolerated the procedure well.       The patient was monitored after the procedure.  Patient was  given post procedure and discharge instructions to follow at home.  We will see the patient back in two weeks or the patient may call to inform of status. The patient was discharged in a stable condition

## 2023-05-08 NOTE — H&P
HPI  Patient presenting for Procedure(s) (LRB):  Bilateral L3-5 MBB (Bilateral)     Patient on Anti-coagulation No    No health changes since previous encounter    Past Medical History:   Diagnosis Date    Hypertension     Unspecified glaucoma 02/15/2023     Past Surgical History:   Procedure Laterality Date    EPIDURAL STEROID INJECTION N/A 2/22/2023    Procedure: Lumbar L4/L5 IL DAISY;  Surgeon: Stefan Torres MD;  Location: [facility];  Service: Pain Management;  Laterality: N/A;    HYSTERECTOMY      INJECTION OF ANESTHETIC AGENT AROUND MEDIAL BRANCH NERVES INNERVATING LUMBAR FACET JOINT Bilateral 4/6/2023    Procedure: Bilateral L3-5 MBB 1st diagnostic block RN IV Sedation;  Surgeon: Stefan Torres MD;  Location: [facility];  Service: Pain Management;  Laterality: Bilateral;    LEG SURGERY      TONSILLECTOMY       Review of patient's allergies indicates:   Allergen Reactions    Ace inhibitors Other (See Comments)     cough      Codeine Nausea And Vomiting    Lisinopril (bulk) Other (See Comments)    Augmentin [amoxicillin-pot clavulanate] Nausea And Vomiting    Amlodipine Swelling     DR. CARRASCO D/C DUE TO SWELLING OF ANKLES    Avapro [irbesartan] Other (See Comments)        No current facility-administered medications on file prior to encounter.     Current Outpatient Medications on File Prior to Encounter   Medication Sig Dispense Refill    furosemide (LASIX) 20 MG tablet Take 20 mg by mouth once daily.      losartan (COZAAR) 50 MG tablet Take 50 mg by mouth once daily.      metoprolol succinate (TOPROL-XL) 25 MG 24 hr tablet TAKE 1 TABLET BY MOUTH DAILY      topiramate (TOPAMAX) 25 MG tablet Take 2 tablets (50 mg total) by mouth every evening. 60 tablet 1    atorvastatin (LIPITOR) 20 MG tablet Take 20 mg by mouth once daily.      clonazePAM (KLONOPIN) 0.5 MG tablet TAKE 1 TABLET BY MOUTH TWICE DAILY AS NEEDED FOR ANXIETY FOR UP TO 30 DAYS      estradiol (ESTRACE) 0.5 MG tablet Take 0.5 mg by mouth  once daily.      hydrochlorothiazide (HYDRODIURIL) 25 MG tablet Take 25 mg by mouth once daily.      hydrOXYzine HCl (ATARAX) 25 MG tablet Take 1 tablet (25 mg total) by mouth nightly as needed for Itching. Caution- can cause drowsiness 30 tablet 0    latanoprost 0.005 % ophthalmic solution Place 1 drop into both eyes once daily.      loratadine (CLARITIN) 10 mg tablet Take 10 mg by mouth once daily.      mupirocin (BACTROBAN) 2 % ointment AAA three times a day 30 g 0    predniSONE (DELTASONE) 20 MG tablet Take 3 pills po qam with breakfast x 1 wk then take 2 po qam with breakfast x 1 wk then take 1 po qam with breakfast x 1 wk 42 tablet 0    triamcinolone acetonide 0.1% (KENALOG) 0.1 % cream AAA bid to affected areas on arms/legs for up to 4 weeks per course 454 g 3        PMHx, PSHx, Allergies, Medications reviewed in epic    ROS negative except pain complaints in HPI    OBJECTIVE:    BP (!) 179/88 (BP Location: Right arm, Patient Position: Sitting)   Pulse 64   Temp 96.9 °F (36.1 °C) (Temporal)   Resp 14   Ht 5' (1.524 m)   Wt 99 kg (218 lb 4.1 oz)   SpO2 95%   Breastfeeding No   BMI 42.63 kg/m²     PHYSICAL EXAMINATION:    GENERAL: Well appearing, in no acute distress, alert and oriented x3.  PSYCH:  Mood and affect appropriate.  SKIN: Skin color, texture, turgor normal, no rashes or lesions which will impact the procedure.  CV: RRR with palpation of the radial artery.  PULM: No evidence of respiratory difficulty, symmetric chest rise. Clear to auscultation.  NEURO: Cranial nerves grossly intact.    Plan:    Proceed with procedure as planned Procedure(s) (LRB):  Bilateral L3-5 MBB (Bilateral)    Stefan Torres MD  05/08/2023

## 2023-05-08 NOTE — DISCHARGE SUMMARY
Discharge Note  Short Stay      SUMMARY     Admit Date: 5/8/2023    Attending Physician: Stefan Torres MD        Discharge Physician: Stefan Torres MD        Discharge Date: 5/8/2023 8:07 AM    Procedure(s) (LRB):  Bilateral L3-5 MBB (Bilateral)    Final Diagnosis: Lumbar spondylosis [M47.816]    Disposition: Home or self care    Patient Instructions:   Current Discharge Medication List        CONTINUE these medications which have NOT CHANGED    Details   furosemide (LASIX) 20 MG tablet Take 20 mg by mouth once daily.      losartan (COZAAR) 50 MG tablet Take 50 mg by mouth once daily.      metoprolol succinate (TOPROL-XL) 25 MG 24 hr tablet TAKE 1 TABLET BY MOUTH DAILY      nabumetone (RELAFEN) 750 MG tablet TAKE 1 TABLET (750 MG TOTAL) BY MOUTH 2 (TWO) TIMES DAILY AS NEEDED FOR PAIN.  Qty: 60 tablet, Refills: 0    Comments: PATIENT IS REQUESTING REFILLS ON RELAFEN 750MG PLEASE REFILL AND FAX BACK THANKS  04/19/2023 3:49:08 PM  Associated Diagnoses: DDD (degenerative disc disease), cervical; DDD (degenerative disc disease), lumbar; Cervical radiculopathy; Lumbar spondylosis; Lumbar radiculopathy; Cervical spondylosis; Dorsalgia, unspecified; Lumbar radiculopathy, chronic      topiramate (TOPAMAX) 25 MG tablet Take 2 tablets (50 mg total) by mouth every evening.  Qty: 60 tablet, Refills: 1    Comments: Take 1 pill at night for 5 days.  Then take 2 pills at night.  Associated Diagnoses: Lumbar radiculopathy      atorvastatin (LIPITOR) 20 MG tablet Take 20 mg by mouth once daily.      clonazePAM (KLONOPIN) 0.5 MG tablet TAKE 1 TABLET BY MOUTH TWICE DAILY AS NEEDED FOR ANXIETY FOR UP TO 30 DAYS      estradiol (ESTRACE) 0.5 MG tablet Take 0.5 mg by mouth once daily.      hydrochlorothiazide (HYDRODIURIL) 25 MG tablet Take 25 mg by mouth once daily.      hydrOXYzine HCl (ATARAX) 25 MG tablet Take 1 tablet (25 mg total) by mouth nightly as needed for Itching. Caution- can cause drowsiness  Qty: 30 tablet, Refills:  0    Associated Diagnoses: Pruritus      latanoprost 0.005 % ophthalmic solution Place 1 drop into both eyes once daily.      loratadine (CLARITIN) 10 mg tablet Take 10 mg by mouth once daily.      mupirocin (BACTROBAN) 2 % ointment AAA three times a day  Qty: 30 g, Refills: 0    Associated Diagnoses: Disease of skin and subcutaneous tissue      predniSONE (DELTASONE) 20 MG tablet Take 3 pills po qam with breakfast x 1 wk then take 2 po qam with breakfast x 1 wk then take 1 po qam with breakfast x 1 wk  Qty: 42 tablet, Refills: 0    Associated Diagnoses: Pruritus; Disease of skin and subcutaneous tissue      triamcinolone acetonide 0.1% (KENALOG) 0.1 % cream AAA bid to affected areas on arms/legs for up to 4 weeks per course  Qty: 454 g, Refills: 3    Associated Diagnoses: Pruritus; Disease of skin and subcutaneous tissue                 Discharge Diagnosis: Lumbar spondylosis [M47.816]  Condition on Discharge: Stable with no complications to procedure   Diet on Discharge: Same as before.  Activity: as per instruction sheet.  Discharge to: Home with a responsible adult.  Follow up: 2-4 weeks       Please call the office at [phone] if you experience any weakness or loss of sensation, fever > 101.5, pain uncontrolled with oral medications, persistent nausea/vomiting/or diarrhea, redness or drainage from the incisions, or any other worrisome concerns. If physician on call was not reached or could not communicate with our office for any reason please go to the nearest emergency department

## 2023-05-08 NOTE — DISCHARGE INSTRUCTIONS

## 2023-05-08 NOTE — PLAN OF CARE
Discharge instructions reviewed with patient, verbalized understanding. Injection sites clean, dry and intact. Voiced no complaints. Vital signs stable. Discharging home with ride.

## 2023-05-09 ENCOUNTER — TELEPHONE (OUTPATIENT)
Dept: PAIN MEDICINE | Facility: HOSPITAL | Age: 68
End: 2023-05-09
Payer: MEDICARE

## 2023-05-09 DIAGNOSIS — M47.816 LUMBAR SPONDYLOSIS: Primary | ICD-10-CM

## 2023-05-09 NOTE — TELEPHONE ENCOUNTER
Patient reports 85% relief for 9 hours after bilateral L3-L5 medial branch block on 05/08/2023.  This is patient's 2nd positive medial branch block.  We will proceed with RFA.

## 2023-05-10 ENCOUNTER — PATIENT MESSAGE (OUTPATIENT)
Dept: PAIN MEDICINE | Facility: CLINIC | Age: 68
End: 2023-05-10
Payer: MEDICARE

## 2023-05-11 NOTE — PRE-PROCEDURE INSTRUCTIONS
Spoke with patient regarding procedure scheduled on 5.17     Arrival time 0815     Has patient been sick with fever or on antibiotics within the last 7 days? No     Does the patient have any open wounds, sores or rashes? No     Does the patient have any recent fractures? no     Has patient received a vaccination within the last 7 days? No     Received the COVID vaccination? yes     Has the patient stopped all medications as directed? na     Does patient have a pacemaker and or defibrillator? no     Does the patient have a ride to and from procedure and someone reliable to remain with patient?      Is the patient diabetic? no     Does the patient have sleep apnea? Or use O2 at home? no     Is the patient receiving sedation? Yes     Is the patient instructed to remain NPO beginning at midnight the night before their procedure? yes     Procedure location confirmed with patient? Yes     Covid- Denies signs/symptoms. Instructed to notify PAT/MD if any changes.

## 2023-05-17 ENCOUNTER — HOSPITAL ENCOUNTER (OUTPATIENT)
Facility: HOSPITAL | Age: 68
Discharge: HOME | End: 2023-05-17
Attending: ANESTHESIOLOGY | Admitting: ANESTHESIOLOGY
Payer: MEDICARE

## 2023-05-17 VITALS
DIASTOLIC BLOOD PRESSURE: 68 MMHG | RESPIRATION RATE: 16 BRPM | BODY MASS INDEX: 42.46 KG/M2 | HEIGHT: 60 IN | WEIGHT: 216.25 LBS | OXYGEN SATURATION: 97 % | SYSTOLIC BLOOD PRESSURE: 150 MMHG | TEMPERATURE: 97 F | HEART RATE: 60 BPM

## 2023-05-17 DIAGNOSIS — M47.816 LUMBAR SPONDYLOSIS: Primary | ICD-10-CM

## 2023-05-17 PROCEDURE — 25000003 PHARM REV CODE 250: Performed by: ANESTHESIOLOGY

## 2023-05-17 PROCEDURE — 64635 PR DESTROY LUMB/SAC FACET JNT: ICD-10-PCS | Mod: 50,,, | Performed by: ANESTHESIOLOGY

## 2023-05-17 PROCEDURE — 64636 DESTROY L/S FACET JNT ADDL: CPT | Mod: 50,,, | Performed by: ANESTHESIOLOGY

## 2023-05-17 PROCEDURE — 64635 DESTROY LUMB/SAC FACET JNT: CPT | Mod: 50 | Performed by: ANESTHESIOLOGY

## 2023-05-17 PROCEDURE — 64635 DESTROY LUMB/SAC FACET JNT: CPT | Mod: 50,,, | Performed by: ANESTHESIOLOGY

## 2023-05-17 PROCEDURE — 64636 DESTROY L/S FACET JNT ADDL: CPT | Mod: 50 | Performed by: ANESTHESIOLOGY

## 2023-05-17 PROCEDURE — 99152 MOD SED SAME PHYS/QHP 5/>YRS: CPT | Performed by: ANESTHESIOLOGY

## 2023-05-17 PROCEDURE — 63600175 PHARM REV CODE 636 W HCPCS: Performed by: ANESTHESIOLOGY

## 2023-05-17 PROCEDURE — 64636 PR DESTROY L/S FACET JNT ADDL: ICD-10-PCS | Mod: 50,,, | Performed by: ANESTHESIOLOGY

## 2023-05-17 RX ORDER — MIDAZOLAM HYDROCHLORIDE 1 MG/ML
INJECTION, SOLUTION INTRAMUSCULAR; INTRAVENOUS
Status: DISCONTINUED | OUTPATIENT
Start: 2023-05-17 | End: 2023-05-17 | Stop reason: HOSPADM

## 2023-05-17 RX ORDER — FENTANYL CITRATE 50 UG/ML
INJECTION, SOLUTION INTRAMUSCULAR; INTRAVENOUS
Status: DISCONTINUED | OUTPATIENT
Start: 2023-05-17 | End: 2023-05-17 | Stop reason: HOSPADM

## 2023-05-17 RX ORDER — LIDOCAINE HYDROCHLORIDE 10 MG/ML
INJECTION, SOLUTION EPIDURAL; INFILTRATION; INTRACAUDAL; PERINEURAL
Status: DISCONTINUED | OUTPATIENT
Start: 2023-05-17 | End: 2023-05-17 | Stop reason: HOSPADM

## 2023-05-17 RX ORDER — ONDANSETRON 2 MG/ML
4 INJECTION INTRAMUSCULAR; INTRAVENOUS ONCE AS NEEDED
Status: DISCONTINUED | OUTPATIENT
Start: 2023-05-17 | End: 2023-05-17 | Stop reason: HOSPADM

## 2023-05-17 RX ORDER — BUPIVACAINE HYDROCHLORIDE 2.5 MG/ML
INJECTION, SOLUTION EPIDURAL; INFILTRATION; INTRACAUDAL
Status: DISCONTINUED | OUTPATIENT
Start: 2023-05-17 | End: 2023-05-17 | Stop reason: HOSPADM

## 2023-05-17 RX ORDER — METHYLPREDNISOLONE ACETATE 40 MG/ML
INJECTION, SUSPENSION INTRA-ARTICULAR; INTRALESIONAL; INTRAMUSCULAR; SOFT TISSUE
Status: DISCONTINUED | OUTPATIENT
Start: 2023-05-17 | End: 2023-05-17 | Stop reason: HOSPADM

## 2023-05-17 NOTE — OP NOTE
Lumbar Medial nerve branch block radiofrequency ablation Under Fluoroscopy, Bilateral L4/L5 and L5/S1    INFORMED CONSENT: The procedure, risks, benefits and options were discussed with patient. There are no contraindications to the procedure. The patient expressed understanding and agreed to proceed. The personnel performing the procedure was discussed.    Date of procedure 05/17/2023    Time-out taken to identify patient and procedure side prior to starting the procedure.                     PROCEDURE: Bilateral radiofrequency ablation of the the medial branch nerves at the   transverse process of  L4, L5, and the sacral ala    Pre Procedure diagnosis:    Lumbar spondylosis [M47.816]  1. Lumbar spondylosis        Post-Procedure diagnosis:   same      PHYSICIAN: Stefan Torres MD    ASSISTANTS: None     LOCAL ANESTHETIC USED: 1ml of Lidocaine 1%, at each level    Sedation: Conscious sedation provided by M.D    SEDATION MEDICATIONS: local/IV sedation: Versed 2 mg and fentanyl 100 mcg IV.  Conscious sedation ordered by MD.  Patient reevaluated and sedation administered by MD and monitored by RN.  Total sedation time was less than 25 min.    Total sedation time was >20 min but <30min    ESTIMATED BLOOD LOSS: None.     COMPLICATIONS: None.       TECHNIQUE: Laying in a prone position, the patient was prepped and draped in the usual sterile fashion using ChloraPrep and fenestrated drape. The level was determined under fluoroscopic guidance. Local anesthetic was given by going down to the hub of the 27-gauge 1.25in needle and raising a wheel. A 18-gauge 10mm curved active tip needle was introduced to the anatomic local of the medial branch at each of the above levels using fluoroscopy. Then sensory and motor testing was performed to confirm that the needle tips were in the correct location. Then after negative aspiration, 1ml of lidocaine PF 1% was injected at each level. This was followed by thermal lesioning at 80  degrees celsius for 90 seconds. After lesioning, 0.5ml of bupivacaine PF 0.25% and 5mg of DepoMedrol was injected at each level.    The patient tolerated the procedure well. Did not have any procedure related motor deficit at the conclusion of the procedure      The patient was monitored for approximately 30 minutes after the procedure.  Patient was given post procedure and discharge instructions to follow at home.  The patient was discharged in a stable condition

## 2023-05-17 NOTE — H&P
HPI  Patient presenting for Procedure(s) (LRB):  Bialteral L3-L5 Lumbar RFA (Bilateral)     Patient on Anti-coagulation No    No health changes since previous encounter    Past Medical History:   Diagnosis Date    Hypertension     Unspecified glaucoma 02/15/2023     Past Surgical History:   Procedure Laterality Date    EPIDURAL STEROID INJECTION N/A 2/22/2023    Procedure: Lumbar L4/L5 IL DAISY;  Surgeon: Stefan Torres MD;  Location: [facility];  Service: Pain Management;  Laterality: N/A;    HYSTERECTOMY      INJECTION OF ANESTHETIC AGENT AROUND MEDIAL BRANCH NERVES INNERVATING LUMBAR FACET JOINT Bilateral 4/6/2023    Procedure: Bilateral L3-5 MBB 1st diagnostic block RN IV Sedation;  Surgeon: Stefan Torres MD;  Location: [facility];  Service: Pain Management;  Laterality: Bilateral;    INJECTION OF ANESTHETIC AGENT AROUND MEDIAL BRANCH NERVES INNERVATING LUMBAR FACET JOINT Bilateral 5/8/2023    Procedure: Bilateral L3-5 MBB;  Surgeon: Stefan Torres MD;  Location: [facility];  Service: Pain Management;  Laterality: Bilateral;    LEG SURGERY      TONSILLECTOMY       Review of patient's allergies indicates:   Allergen Reactions    Ace inhibitors Other (See Comments)     cough      Codeine Nausea And Vomiting    Lisinopril (bulk) Other (See Comments)    Augmentin [amoxicillin-pot clavulanate] Nausea And Vomiting    Amlodipine Swelling     DR. CARRASCO D/C DUE TO SWELLING OF ANKLES    Avapro [irbesartan] Other (See Comments)        No current facility-administered medications on file prior to encounter.     Current Outpatient Medications on File Prior to Encounter   Medication Sig Dispense Refill    atorvastatin (LIPITOR) 20 MG tablet Take 20 mg by mouth once daily.      clonazePAM (KLONOPIN) 0.5 MG tablet TAKE 1 TABLET BY MOUTH TWICE DAILY AS NEEDED FOR ANXIETY FOR UP TO 30 DAYS      estradiol (ESTRACE) 0.5 MG tablet Take 0.5 mg by mouth once daily.      furosemide (LASIX) 20 MG tablet Take 20 mg by  mouth once daily.      hydrOXYzine HCl (ATARAX) 25 MG tablet Take 1 tablet (25 mg total) by mouth nightly as needed for Itching. Caution- can cause drowsiness 30 tablet 0    latanoprost 0.005 % ophthalmic solution Place 1 drop into both eyes once daily.      loratadine (CLARITIN) 10 mg tablet Take 10 mg by mouth once daily.      losartan (COZAAR) 50 MG tablet Take 50 mg by mouth once daily.      metoprolol succinate (TOPROL-XL) 25 MG 24 hr tablet TAKE 1 TABLET BY MOUTH DAILY      mupirocin (BACTROBAN) 2 % ointment AAA three times a day 30 g 0    nabumetone (RELAFEN) 750 MG tablet TAKE 1 TABLET (750 MG TOTAL) BY MOUTH 2 (TWO) TIMES DAILY AS NEEDED FOR PAIN. 60 tablet 0    predniSONE (DELTASONE) 20 MG tablet Take 3 pills po qam with breakfast x 1 wk then take 2 po qam with breakfast x 1 wk then take 1 po qam with breakfast x 1 wk 42 tablet 0    topiramate (TOPAMAX) 25 MG tablet Take 2 tablets (50 mg total) by mouth every evening. 60 tablet 1    triamcinolone acetonide 0.1% (KENALOG) 0.1 % cream AAA bid to affected areas on arms/legs for up to 4 weeks per course 454 g 3    hydrochlorothiazide (HYDRODIURIL) 25 MG tablet Take 25 mg by mouth once daily.          PMHx, PSHx, Allergies, Medications reviewed in epic    ROS negative except pain complaints in HPI    OBJECTIVE:    BP (!) 194/85 (BP Location: Right arm, Patient Position: Sitting)   Pulse (!) 59   Temp 97.1 °F (36.2 °C) (Temporal)   Resp 18   Ht 5' (1.524 m)   Wt 98.1 kg (216 lb 4.3 oz)   SpO2 98%   Breastfeeding No   BMI 42.24 kg/m²     PHYSICAL EXAMINATION:    GENERAL: Well appearing, in no acute distress, alert and oriented x3.  PSYCH:  Mood and affect appropriate.  SKIN: Skin color, texture, turgor normal, no rashes or lesions which will impact the procedure.  CV: RRR with palpation of the radial artery.  PULM: No evidence of respiratory difficulty, symmetric chest rise. Clear to auscultation.  NEURO: Cranial nerves grossly intact.    Plan:    Proceed  with procedure as planned Procedure(s) (LRB):  Bialteral L3-L5 Lumbar RFA (Bilateral)    Stefan Torres MD  05/17/2023

## 2023-05-17 NOTE — DISCHARGE INSTRUCTIONS

## 2023-05-17 NOTE — DISCHARGE SUMMARY
Discharge Note  Short Stay      SUMMARY     Admit Date: 5/17/2023    Attending Physician: Stefan Torres MD        Discharge Physician: Stefan Torres MD        Discharge Date: 5/17/2023 9:13 AM    Procedure(s) (LRB):  Bialteral L3-L5 Lumbar RFA (Bilateral)    Final Diagnosis: Lumbar spondylosis [M47.816]    Disposition: Home or self care    Patient Instructions:   Current Discharge Medication List        CONTINUE these medications which have NOT CHANGED    Details   atorvastatin (LIPITOR) 20 MG tablet Take 20 mg by mouth once daily.      clonazePAM (KLONOPIN) 0.5 MG tablet TAKE 1 TABLET BY MOUTH TWICE DAILY AS NEEDED FOR ANXIETY FOR UP TO 30 DAYS      estradiol (ESTRACE) 0.5 MG tablet Take 0.5 mg by mouth once daily.      furosemide (LASIX) 20 MG tablet Take 20 mg by mouth once daily.      hydrOXYzine HCl (ATARAX) 25 MG tablet Take 1 tablet (25 mg total) by mouth nightly as needed for Itching. Caution- can cause drowsiness  Qty: 30 tablet, Refills: 0    Associated Diagnoses: Pruritus      latanoprost 0.005 % ophthalmic solution Place 1 drop into both eyes once daily.      loratadine (CLARITIN) 10 mg tablet Take 10 mg by mouth once daily.      losartan (COZAAR) 50 MG tablet Take 50 mg by mouth once daily.      metoprolol succinate (TOPROL-XL) 25 MG 24 hr tablet TAKE 1 TABLET BY MOUTH DAILY      mupirocin (BACTROBAN) 2 % ointment AAA three times a day  Qty: 30 g, Refills: 0    Associated Diagnoses: Disease of skin and subcutaneous tissue      nabumetone (RELAFEN) 750 MG tablet TAKE 1 TABLET (750 MG TOTAL) BY MOUTH 2 (TWO) TIMES DAILY AS NEEDED FOR PAIN.  Qty: 60 tablet, Refills: 0    Comments: PATIENT IS REQUESTING REFILLS ON RELAFEN 750MG PLEASE REFILL AND FAX BACK THANKS  04/19/2023 3:49:08 PM  Associated Diagnoses: DDD (degenerative disc disease), cervical; DDD (degenerative disc disease), lumbar; Cervical radiculopathy; Lumbar spondylosis; Lumbar radiculopathy; Cervical spondylosis; Dorsalgia, unspecified;  Lumbar radiculopathy, chronic      predniSONE (DELTASONE) 20 MG tablet Take 3 pills po qam with breakfast x 1 wk then take 2 po qam with breakfast x 1 wk then take 1 po qam with breakfast x 1 wk  Qty: 42 tablet, Refills: 0    Associated Diagnoses: Pruritus; Disease of skin and subcutaneous tissue      topiramate (TOPAMAX) 25 MG tablet Take 2 tablets (50 mg total) by mouth every evening.  Qty: 60 tablet, Refills: 1    Comments: Take 1 pill at night for 5 days.  Then take 2 pills at night.  Associated Diagnoses: Lumbar radiculopathy      triamcinolone acetonide 0.1% (KENALOG) 0.1 % cream AAA bid to affected areas on arms/legs for up to 4 weeks per course  Qty: 454 g, Refills: 3    Associated Diagnoses: Pruritus; Disease of skin and subcutaneous tissue      hydrochlorothiazide (HYDRODIURIL) 25 MG tablet Take 25 mg by mouth once daily.                 Discharge Diagnosis: Lumbar spondylosis [M47.816]  Condition on Discharge: Stable with no complications to procedure   Diet on Discharge: Same as before.  Activity: as per instruction sheet.  Discharge to: Home with a responsible adult.  Follow up: 2-4 weeks       Please call the office at [phone] if you experience any weakness or loss of sensation, fever > 101.5, pain uncontrolled with oral medications, persistent nausea/vomiting/or diarrhea, redness or drainage from the incisions, or any other worrisome concerns. If physician on call was not reached or could not communicate with our office for any reason please go to the nearest emergency department

## 2023-05-22 ENCOUNTER — TELEPHONE (OUTPATIENT)
Dept: PAIN MEDICINE | Facility: CLINIC | Age: 68
End: 2023-05-22
Payer: MEDICARE
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
## 2024-04-24 ENCOUNTER — TELEPHONE (OUTPATIENT)
Dept: PRIMARY CARE CLINIC | Facility: CLINIC | Age: 69
End: 2024-04-24
Payer: MEDICARE

## 2024-04-24 ENCOUNTER — PATIENT MESSAGE (OUTPATIENT)
Dept: PRIMARY CARE CLINIC | Facility: CLINIC | Age: 69
End: 2024-04-24
Payer: MEDICARE

## 2024-04-24 NOTE — TELEPHONE ENCOUNTER
Called patient to inform her new patient appointment will be canceled, no answer/left message and also sent my chart message.

## 2025-06-02 ENCOUNTER — PATIENT MESSAGE (OUTPATIENT)
Dept: RESEARCH | Facility: HOSPITAL | Age: 70
End: 2025-06-02
Payer: MEDICARE